# Patient Record
Sex: FEMALE | Race: BLACK OR AFRICAN AMERICAN | NOT HISPANIC OR LATINO | Employment: UNEMPLOYED | URBAN - METROPOLITAN AREA
[De-identification: names, ages, dates, MRNs, and addresses within clinical notes are randomized per-mention and may not be internally consistent; named-entity substitution may affect disease eponyms.]

---

## 2017-01-02 ENCOUNTER — APPOINTMENT (OUTPATIENT)
Dept: BEHAVIORAL HEALTH | Age: 32
End: 2017-01-02
Attending: PSYCHIATRY & NEUROLOGY

## 2017-01-03 ENCOUNTER — TELEPHONE (OUTPATIENT)
Dept: BEHAVIORAL HEALTH | Age: 32
End: 2017-01-03

## 2017-01-03 RX ORDER — LORAZEPAM 1 MG/1
1 TABLET ORAL DAILY PRN
Qty: 10 TABLET | Refills: 0 | Status: SHIPPED | OUTPATIENT
Start: 2017-01-03 | End: 2017-03-01 | Stop reason: SDUPTHER

## 2017-01-04 ENCOUNTER — APPOINTMENT (OUTPATIENT)
Dept: BEHAVIORAL HEALTH | Age: 32
End: 2017-01-04
Attending: PSYCHIATRY & NEUROLOGY

## 2017-01-06 ENCOUNTER — APPOINTMENT (OUTPATIENT)
Dept: BEHAVIORAL HEALTH | Age: 32
End: 2017-01-06
Attending: PSYCHIATRY & NEUROLOGY

## 2017-01-09 ENCOUNTER — APPOINTMENT (OUTPATIENT)
Dept: BEHAVIORAL HEALTH | Age: 32
End: 2017-01-09
Attending: PSYCHIATRY & NEUROLOGY

## 2017-01-11 ENCOUNTER — APPOINTMENT (OUTPATIENT)
Dept: BEHAVIORAL HEALTH | Age: 32
End: 2017-01-11
Attending: PSYCHIATRY & NEUROLOGY

## 2017-01-13 ENCOUNTER — APPOINTMENT (OUTPATIENT)
Dept: BEHAVIORAL HEALTH | Age: 32
End: 2017-01-13
Attending: PSYCHIATRY & NEUROLOGY

## 2017-01-16 ENCOUNTER — APPOINTMENT (OUTPATIENT)
Dept: BEHAVIORAL HEALTH | Age: 32
End: 2017-01-16
Attending: PSYCHIATRY & NEUROLOGY

## 2017-01-18 ENCOUNTER — APPOINTMENT (OUTPATIENT)
Dept: BEHAVIORAL HEALTH | Age: 32
End: 2017-01-18
Attending: PSYCHIATRY & NEUROLOGY

## 2017-01-20 ENCOUNTER — APPOINTMENT (OUTPATIENT)
Dept: BEHAVIORAL HEALTH | Age: 32
End: 2017-01-20
Attending: PSYCHIATRY & NEUROLOGY

## 2017-01-23 ENCOUNTER — APPOINTMENT (OUTPATIENT)
Dept: BEHAVIORAL HEALTH | Age: 32
End: 2017-01-23
Attending: PSYCHIATRY & NEUROLOGY

## 2017-02-10 ENCOUNTER — APPOINTMENT (OUTPATIENT)
Dept: GENERAL RADIOLOGY | Age: 32
End: 2017-02-10

## 2017-02-10 ENCOUNTER — HOSPITAL ENCOUNTER (EMERGENCY)
Age: 32
Discharge: HOME | End: 2017-02-10

## 2017-02-10 VITALS
HEART RATE: 80 BPM | OXYGEN SATURATION: 97 % | SYSTOLIC BLOOD PRESSURE: 120 MMHG | RESPIRATION RATE: 18 BRPM | TEMPERATURE: 97 F | DIASTOLIC BLOOD PRESSURE: 83 MMHG

## 2017-02-10 DIAGNOSIS — K59.00 CONSTIPATION, UNSPECIFIED CONSTIPATION TYPE: Primary | ICD-10-CM

## 2017-02-10 DIAGNOSIS — N39.0 ACUTE UTI: ICD-10-CM

## 2017-02-10 LAB
APPEARANCE UR: CLEAR
B-HCG UR QL: NEGATIVE
BACTERIA #/AREA URNS HPF: ABNORMAL /HPF
BILIRUB UR QL STRIP: NEGATIVE
COLOR UR: YELLOW
GLUCOSE UR STRIP-MCNC: NEGATIVE MG/DL
HGB UR QL STRIP: NEGATIVE
HYALINE CASTS #/AREA URNS LPF: ABNORMAL /LPF (ref 0–5)
KETONES UR STRIP-MCNC: NEGATIVE MG/DL
LEUKOCYTE ESTERASE UR QL STRIP: ABNORMAL
NITRITE UR QL STRIP: NEGATIVE
PH UR STRIP: 6 UNITS (ref 5–7)
PROT UR STRIP-MCNC: NEGATIVE MG/DL
RBC #/AREA URNS HPF: ABNORMAL /HPF (ref 0–3)
SP GR UR STRIP: 1.02 (ref 1–1.03)
SPECIMEN SOURCE: ABNORMAL
SQUAMOUS #/AREA URNS HPF: ABNORMAL /HPF (ref 0–5)
UROBILINOGEN UR STRIP-MCNC: 0.2 MG/DL (ref 0–1)
WBC #/AREA URNS HPF: ABNORMAL /HPF (ref 0–5)

## 2017-02-10 PROCEDURE — 74022 RADEX COMPL AQT ABD SERIES: CPT | Performed by: RADIOLOGY

## 2017-02-10 PROCEDURE — 99283 EMERGENCY DEPT VISIT LOW MDM: CPT | Performed by: PHYSICIAN ASSISTANT

## 2017-02-10 PROCEDURE — 74022 RADEX COMPL AQT ABD SERIES: CPT

## 2017-02-10 PROCEDURE — 81025 URINE PREGNANCY TEST: CPT | Performed by: EMERGENCY MEDICINE

## 2017-02-10 PROCEDURE — 81001 URINALYSIS AUTO W/SCOPE: CPT

## 2017-02-10 PROCEDURE — 99284 EMERGENCY DEPT VISIT MOD MDM: CPT

## 2017-02-10 PROCEDURE — 51798 US URINE CAPACITY MEASURE: CPT | Performed by: EMERGENCY MEDICINE

## 2017-02-10 PROCEDURE — 87086 URINE CULTURE/COLONY COUNT: CPT

## 2017-02-10 RX ORDER — CIPROFLOXACIN 250 MG/1
250 TABLET, FILM COATED ORAL 2 TIMES DAILY
Qty: 6 TABLET | Refills: 0 | Status: SHIPPED | OUTPATIENT
Start: 2017-02-10 | End: 2017-02-13

## 2017-02-11 LAB
APPEARANCE SPEC: NORMAL
BACTERIA UR CULT: NORMAL
REPORT STATUS (RPT): NORMAL

## 2017-02-12 ENCOUNTER — HOSPITAL ENCOUNTER (EMERGENCY)
Age: 32
Discharge: HOME | End: 2017-02-12

## 2017-02-12 VITALS
SYSTOLIC BLOOD PRESSURE: 127 MMHG | OXYGEN SATURATION: 98 % | RESPIRATION RATE: 16 BRPM | DIASTOLIC BLOOD PRESSURE: 84 MMHG | BODY MASS INDEX: 43.4 KG/M2 | WEIGHT: 293 LBS | TEMPERATURE: 97.2 F | HEIGHT: 69 IN | HEART RATE: 93 BPM

## 2017-02-12 DIAGNOSIS — S76.212A GROIN STRAIN, LEFT, INITIAL ENCOUNTER: Primary | ICD-10-CM

## 2017-02-12 PROCEDURE — 10004651 HB RX, NO CHARGE ITEM: Performed by: EMERGENCY MEDICINE

## 2017-02-12 PROCEDURE — 99283 EMERGENCY DEPT VISIT LOW MDM: CPT

## 2017-02-12 PROCEDURE — 99283 EMERGENCY DEPT VISIT LOW MDM: CPT | Performed by: EMERGENCY MEDICINE

## 2017-02-12 RX ORDER — ACETAMINOPHEN 325 MG/1
650 TABLET ORAL ONCE
Status: COMPLETED | OUTPATIENT
Start: 2017-02-12 | End: 2017-02-12

## 2017-02-12 RX ADMIN — ACETAMINOPHEN 650 MG: 325 TABLET ORAL at 17:34

## 2017-02-12 ASSESSMENT — PAIN SCALES - GENERAL: PAINLEVEL_OUTOF10: 10

## 2017-03-05 VITALS
HEART RATE: 89 BPM | HEIGHT: 69 IN | TEMPERATURE: 98.3 F | SYSTOLIC BLOOD PRESSURE: 151 MMHG | BODY MASS INDEX: 43.4 KG/M2 | DIASTOLIC BLOOD PRESSURE: 90 MMHG | RESPIRATION RATE: 20 BRPM | OXYGEN SATURATION: 99 % | WEIGHT: 293 LBS

## 2017-03-05 PROCEDURE — 99283 EMERGENCY DEPT VISIT LOW MDM: CPT | Performed by: NURSE PRACTITIONER

## 2017-03-05 ASSESSMENT — PAIN SCALES - GENERAL: PAINLEVEL_OUTOF10: 9

## 2017-03-06 ENCOUNTER — HOSPITAL ENCOUNTER (EMERGENCY)
Age: 32
Discharge: HOME | End: 2017-03-06
Attending: EMERGENCY MEDICINE

## 2017-03-06 DIAGNOSIS — R11.2 NAUSEA AND VOMITING IN ADULT: Primary | ICD-10-CM

## 2017-03-06 LAB
APPEARANCE UR: CLEAR
B-HCG UR QL: NORMAL
BILIRUB UR QL STRIP: ABNORMAL
COLOR UR: YELLOW
GLUCOSE UR STRIP-MCNC: NEGATIVE MG/DL
HGB UR QL STRIP: NEGATIVE
KETONES UR STRIP-MCNC: 15 MG/DL
LEUKOCYTE ESTERASE UR QL STRIP: NEGATIVE
NITRITE UR QL STRIP: NEGATIVE
PH UR STRIP: 6 UNITS (ref 5–7)
PROT UR STRIP-MCNC: 30 MG/DL
SP GR UR STRIP: 1.02 (ref 1–1.03)
UROBILINOGEN UR STRIP-MCNC: 1 MG/DL (ref 0–1)

## 2017-03-06 PROCEDURE — 10002803 HB RX 637: Performed by: EMERGENCY MEDICINE

## 2017-03-06 PROCEDURE — 99284 EMERGENCY DEPT VISIT MOD MDM: CPT | Performed by: EMERGENCY MEDICINE

## 2017-03-06 PROCEDURE — 81003 URINALYSIS AUTO W/O SCOPE: CPT

## 2017-03-06 PROCEDURE — 81025 URINE PREGNANCY TEST: CPT | Performed by: EMERGENCY MEDICINE

## 2017-03-06 RX ORDER — ONDANSETRON HYDROCHLORIDE 8 MG/1
8 TABLET, FILM COATED ORAL EVERY 8 HOURS PRN
Qty: 20 TABLET | Refills: 0 | Status: SHIPPED | OUTPATIENT
Start: 2017-03-06 | End: 2017-05-23 | Stop reason: ALTCHOICE

## 2017-03-06 RX ORDER — ONDANSETRON 4 MG/1
8 TABLET, ORALLY DISINTEGRATING ORAL ONCE
Status: COMPLETED | OUTPATIENT
Start: 2017-03-06 | End: 2017-03-06

## 2017-03-06 RX ADMIN — ONDANSETRON 8 MG: 4 TABLET, ORALLY DISINTEGRATING ORAL at 01:16

## 2017-03-06 ASSESSMENT — PAIN SCALES - GENERAL: PAINLEVEL_OUTOF10: 9

## 2017-03-28 ENCOUNTER — HOSPITAL ENCOUNTER (EMERGENCY)
Age: 32
Discharge: HOME | End: 2017-03-29

## 2017-03-28 DIAGNOSIS — R10.2 PELVIC PAIN IN FEMALE: ICD-10-CM

## 2017-03-28 DIAGNOSIS — Z32.02 PREGNANCY EXAMINATION OR TEST, NEGATIVE RESULT: Primary | ICD-10-CM

## 2017-03-28 PROCEDURE — 99284 EMERGENCY DEPT VISIT MOD MDM: CPT

## 2017-03-28 PROCEDURE — 81025 URINE PREGNANCY TEST: CPT | Performed by: EMERGENCY MEDICINE

## 2017-03-28 RX ORDER — ZOLPIDEM TARTRATE 10 MG/1
TABLET ORAL
Qty: 30 TABLET | Refills: 0 | Status: CANCELLED | OUTPATIENT
Start: 2017-03-28

## 2017-03-28 RX ORDER — LORAZEPAM 1 MG/1
TABLET ORAL
Qty: 30 TABLET | Refills: 0 | Status: CANCELLED | OUTPATIENT
Start: 2017-03-28

## 2017-03-28 ASSESSMENT — PAIN SCALES - GENERAL: PAINLEVEL_OUTOF10: 10

## 2017-03-29 ENCOUNTER — TELEPHONE (OUTPATIENT)
Dept: BEHAVIORAL HEALTH | Age: 32
End: 2017-03-29

## 2017-03-29 VITALS
RESPIRATION RATE: 16 BRPM | TEMPERATURE: 98.5 F | HEART RATE: 74 BPM | DIASTOLIC BLOOD PRESSURE: 72 MMHG | OXYGEN SATURATION: 99 % | SYSTOLIC BLOOD PRESSURE: 138 MMHG

## 2017-03-29 LAB
APPEARANCE UR: ABNORMAL
B-HCG UR QL: NEGATIVE
BILIRUB UR QL STRIP: NEGATIVE
COLOR UR: ABNORMAL
GLUCOSE UR STRIP-MCNC: NEGATIVE MG/DL
HGB UR QL STRIP: ABNORMAL
KETONES UR STRIP-MCNC: NEGATIVE MG/DL
LEUKOCYTE ESTERASE UR QL STRIP: ABNORMAL
NITRITE UR QL STRIP: NEGATIVE
PH UR STRIP: 5.5 UNITS (ref 5–7)
PROT UR STRIP-MCNC: 100 MG/DL
SP GR UR STRIP: 1.02 (ref 1–1.03)
UROBILINOGEN UR STRIP-MCNC: 0.2 MG/DL (ref 0–1)

## 2017-03-29 PROCEDURE — 81003 URINALYSIS AUTO W/O SCOPE: CPT

## 2017-03-29 PROCEDURE — 99283 EMERGENCY DEPT VISIT LOW MDM: CPT | Performed by: PHYSICIAN ASSISTANT

## 2017-03-29 RX ORDER — ACETAMINOPHEN 500 MG
1000 TABLET ORAL ONCE
Status: DISCONTINUED | OUTPATIENT
Start: 2017-03-29 | End: 2017-03-29

## 2017-03-29 ASSESSMENT — ENCOUNTER SYMPTOMS
SORE THROAT: 0
BLOOD IN STOOL: 0
ABDOMINAL PAIN: 0
BACK PAIN: 0
HEADACHES: 0
WOUND: 0
DIZZINESS: 0
APPETITE CHANGE: 0
CONSTIPATION: 0
FATIGUE: 0
CHILLS: 0
DIARRHEA: 0
WEAKNESS: 0
NAUSEA: 0
VOMITING: 0
SHORTNESS OF BREATH: 0
WHEEZING: 0
CONFUSION: 0
FEVER: 0
COUGH: 0
BRUISES/BLEEDS EASILY: 0

## 2017-03-30 RX ORDER — LORAZEPAM 1 MG/1
1 TABLET ORAL EVERY MORNING
Qty: 30 TABLET | Refills: 0 | Status: SHIPPED
Start: 2017-03-30 | End: 2017-04-14

## 2017-03-30 RX ORDER — ZOLPIDEM TARTRATE 10 MG/1
10 TABLET ORAL NIGHTLY
Qty: 30 TABLET | Refills: 0 | Status: SHIPPED
Start: 2017-03-30 | End: 2017-04-29

## 2017-04-04 ENCOUNTER — TELEPHONE (OUTPATIENT)
Dept: BEHAVIORAL HEALTH | Age: 32
End: 2017-04-04

## 2017-04-14 ENCOUNTER — TELEPHONE (OUTPATIENT)
Dept: BEHAVIORAL HEALTH | Age: 32
End: 2017-04-14

## 2017-04-14 RX ORDER — CLONAZEPAM 0.5 MG/1
0.5 TABLET ORAL 2 TIMES DAILY PRN
Qty: 30 TABLET | Refills: 1 | Status: ON HOLD
Start: 2017-04-14 | End: 2017-05-24 | Stop reason: HOSPADM

## 2017-05-23 ENCOUNTER — HOSPITAL ENCOUNTER (INPATIENT)
Dept: BEHAVIORAL HEALTH | Age: 32
LOS: 2 days | Discharge: HOME | DRG: 751 | End: 2017-05-25
Attending: PSYCHIATRY & NEUROLOGY | Admitting: PSYCHIATRY & NEUROLOGY

## 2017-05-23 PROCEDURE — 10004577 HB ROOM CHARGE PSYCH

## 2017-05-23 PROCEDURE — HZ2ZZZZ DETOXIFICATION SERVICES FOR SUBSTANCE ABUSE TREATMENT: ICD-10-PCS | Performed by: PSYCHIATRY & NEUROLOGY

## 2017-05-23 PROCEDURE — 81025 URINE PREGNANCY TEST: CPT | Performed by: PSYCHIATRY & NEUROLOGY

## 2017-05-23 RX ORDER — MAGNESIUM HYDROXIDE/ALUMINUM HYDROXICE/SIMETHICONE 120; 1200; 1200 MG/30ML; MG/30ML; MG/30ML
30 SUSPENSION ORAL EVERY 4 HOURS PRN
Status: DISCONTINUED | OUTPATIENT
Start: 2017-05-23 | End: 2017-05-25 | Stop reason: HOSPADM

## 2017-05-23 RX ORDER — ZOLPIDEM TARTRATE 5 MG/1
5 TABLET ORAL NIGHTLY PRN
Status: DISCONTINUED | OUTPATIENT
Start: 2017-05-23 | End: 2017-05-24

## 2017-05-23 RX ORDER — ALBUTEROL SULFATE 90 UG/1
2 AEROSOL, METERED RESPIRATORY (INHALATION) EVERY 4 HOURS PRN
Status: DISCONTINUED | OUTPATIENT
Start: 2017-05-23 | End: 2017-05-25 | Stop reason: HOSPADM

## 2017-05-23 RX ORDER — HALOPERIDOL 5 MG/ML
5 INJECTION INTRAMUSCULAR
Status: DISCONTINUED | OUTPATIENT
Start: 2017-05-23 | End: 2017-05-25 | Stop reason: HOSPADM

## 2017-05-23 RX ORDER — OLANZAPINE 5 MG/1
5 TABLET, ORALLY DISINTEGRATING ORAL
Status: DISCONTINUED | OUTPATIENT
Start: 2017-05-23 | End: 2017-05-25 | Stop reason: HOSPADM

## 2017-05-23 RX ORDER — ACETAMINOPHEN 325 MG/1
650 TABLET ORAL EVERY 4 HOURS PRN
Status: DISCONTINUED | OUTPATIENT
Start: 2017-05-23 | End: 2017-05-25 | Stop reason: HOSPADM

## 2017-05-23 RX ORDER — BENZTROPINE MESYLATE 1 MG/ML
1 INJECTION INTRAMUSCULAR; INTRAVENOUS EVERY 4 HOURS PRN
Status: DISCONTINUED | OUTPATIENT
Start: 2017-05-23 | End: 2017-05-25 | Stop reason: HOSPADM

## 2017-05-23 RX ORDER — BENZTROPINE MESYLATE 1 MG/1
1 TABLET ORAL EVERY 4 HOURS PRN
Status: DISCONTINUED | OUTPATIENT
Start: 2017-05-23 | End: 2017-05-25 | Stop reason: HOSPADM

## 2017-05-23 RX ORDER — AMOXICILLIN 250 MG
2 CAPSULE ORAL DAILY PRN
Status: DISCONTINUED | OUTPATIENT
Start: 2017-05-23 | End: 2017-05-25 | Stop reason: HOSPADM

## 2017-05-23 RX ORDER — LOPERAMIDE HYDROCHLORIDE 2 MG/1
2 CAPSULE ORAL PRN
Status: DISCONTINUED | OUTPATIENT
Start: 2017-05-23 | End: 2017-05-25 | Stop reason: HOSPADM

## 2017-05-23 RX ORDER — ONDANSETRON 4 MG/1
4 TABLET, ORALLY DISINTEGRATING ORAL 2 TIMES DAILY PRN
Status: DISCONTINUED | OUTPATIENT
Start: 2017-05-23 | End: 2017-05-25 | Stop reason: HOSPADM

## 2017-05-23 RX ORDER — LORAZEPAM 1 MG/1
1 TABLET ORAL EVERY 4 HOURS PRN
Status: DISCONTINUED | OUTPATIENT
Start: 2017-05-23 | End: 2017-05-24

## 2017-05-23 RX ORDER — NICOTINE 21 MG/24HR
1 PATCH, TRANSDERMAL 24 HOURS TRANSDERMAL DAILY
Status: DISCONTINUED | OUTPATIENT
Start: 2017-05-23 | End: 2017-05-25 | Stop reason: HOSPADM

## 2017-05-23 RX ORDER — LORAZEPAM 2 MG/ML
1 INJECTION INTRAMUSCULAR EVERY 4 HOURS PRN
Status: DISCONTINUED | OUTPATIENT
Start: 2017-05-23 | End: 2017-05-25 | Stop reason: HOSPADM

## 2017-05-23 RX ORDER — HALOPERIDOL 5 MG/1
10 TABLET ORAL
Status: DISCONTINUED | OUTPATIENT
Start: 2017-05-23 | End: 2017-05-25 | Stop reason: HOSPADM

## 2017-05-23 ASSESSMENT — ACTIVITIES OF DAILY LIVING (ADL)
ADL_SCORE: 12
ADL_BEFORE_ADMISSION: INDEPENDENT
RECENT_DECLINE_ADL: NO
CHRONIC_PAIN_PRESENT: NO
ADL_SHORT_OF_BREATH: NO

## 2017-05-23 ASSESSMENT — LIFESTYLE VARIABLES
BENZODIAZEPINES / SEDATIVES: YES
ALCOHOL_TYPE: HARD LIQUOR
HOW MANY STANDARD DRINKS CONTAINING ALCOHOL DO YOU HAVE ON A TYPICAL DAY: 7 TO 9
VOLATILE SOLVENTS / INHALANTS: DENIES
CAFFEINE: YES
HOW MANY STANDARD DRINKS CONTAINING ALCOHOL DO YOU HAVE ON A TYPICAL DAY: 7 TO 9
HOW OFTEN HAVE YOU BEEN INVOLVED IN ANY CRIMINAL OR ILLEGAL ACTIVITIES SUCH AS DRIVING A MOTOR VEHICLE UNDER THE INFLUENCE OF ALCOHOL OR DRUGS, ASSAULT, SHOPLIFTING, SUPPLYING AN ILLICIT SUBSTANCE TO ANOTHER PERSON (DO NOT INCLUDE USING ILLEGAL DRUGS).: YES
OPIATES: DENIES
E-CIGARETTE_USE: NO E-CIGARETTES USE IN THE LAST 30 DAYS
TOBACCO_USE_STATUS_IN_THE_LAST_30_DAYS: NO TOBACCO USED IN THE LAST 30 DAYS
E-CIGARETTE_USE: NO E-CIGARETTES USE IN THE LAST 30 DAYS
AUDIT-C TOTAL SCORE: 11
HOW OFTEN DO YOU HAVE A DRINK CONTAINING ALCOHOL: 4 OR MORE TIMES PER WEEK
HOW OFTEN DO YOU HAVE 6 OR MORE DRINKS ON ONE OCCASION: DAILY OR ALMOST DAILY
MARIJUANA, HASHISH: DENIES
COCAINE: DENIES
HOW OFTEN DO YOU HAVE 6 OR MORE DRINKS ON ONE OCCASION: DAILY OR ALMOST DAILY
VOLATILE SOLVENTS / INHALANTS: DENIES
MARIJUANA, HASHISH: DENIES
AUDIT-C TOTAL SCORE: 11
HALLUCINOGENS: DENIES
ALCOHOL_TYPE: HARD LIQUOR
HALLUCINOGENS: DENIES
AMPHETAMINES / STIMULANTS: DENIES
ALCOHOL_USE: YES
COCAINE: DENIES
ALCOHOL_USE_STATUS: UNHEALTHY DRINKING IDENTIFIED. AUDIT C: 3 OR MORE FOR WOMEN AND 4 OR MORE FOR MEN.
HOW OFTEN DO YOU HAVE A DRINK CONTAINING ALCOHOL: 4 OR MORE TIMES PER WEEK
ALCOHOL_USE: YES
OPIATES: DENIES
AMPHETAMINES / STIMULANTS: DENIES
BENZODIAZEPINES / SEDATIVES: YES

## 2017-05-23 ASSESSMENT — COGNITIVE AND FUNCTIONAL STATUS - GENERAL
ARE YOU BLIND OR DO YOU HAVE SERIOUS DIFFICULTY SEEING, EVEN WHEN WEARING GLASSES: NO
JUDGEMENT: POOR
THOUGHT_PROCESS: UNREMARKABLE
PERCEPTUAL_DISORDERS_HALLUCINATIONS: AUDITORY;DESCRIBE COMMANDS
MOTOR_BEHAVIOR_AGITATION: UNREMARKABLE
AFFECT_AND_BEHAVIOR: DEPRESSED;SUICIDAL/SUICIDAL IDEATION;SAD
APPEARANCE_AND_DRESS: UNREMARKABLE
ORIENTED: PERSON;PLACE;CIRCUMSTANCE;TIME
ATTENTION: ABILITY TO MAINTAIN ATTENTION
SPEECH: UNREMARKABLE
INSIGHT: POOR
MEMORY: ABILITY TO RETAIN PAST AND PRESENT EVENTS
RELIABILITY: APPEARS TO BE TRUTHFUL;APPEARS TO MINIMIZE
LEVEL_OF_CONSCIOUSNESS: ALERT;ORIENTED
ARE YOU DEAF OR DO YOU HAVE SERIOUS DIFFICULTY  HEARING: NO
THOUGHT_CONTENT: UNREMARKABLE
MOOD: DEPRESSED;IRRITABLE
MOTOR_BEHAVIOR_RETARDATION: UNREMARKABLE

## 2017-05-24 PROBLEM — F33.2 MDD (MAJOR DEPRESSIVE DISORDER), RECURRENT EPISODE, SEVERE (CMD): Status: ACTIVE | Noted: 2017-05-24

## 2017-05-24 PROBLEM — F10.930 ALCOHOL WITHDRAWAL SYNDROME WITHOUT COMPLICATION (CMD): Status: ACTIVE | Noted: 2017-05-24

## 2017-05-24 PROBLEM — Z86.39 HISTORY OF VITAMIN D DEFICIENCY: Status: ACTIVE | Noted: 2017-05-24

## 2017-05-24 PROBLEM — F43.10 PTSD (POST-TRAUMATIC STRESS DISORDER): Status: ACTIVE | Noted: 2017-05-24

## 2017-05-24 LAB
ALBUMIN SERPL-MCNC: 3.5 G/DL (ref 3.6–5.1)
ALBUMIN/GLOB SERPL: 1 {RATIO} (ref 1–2.4)
ALP SERPL-CCNC: 112 UNITS/L (ref 45–117)
ALT SERPL-CCNC: 21 UNITS/L
ANION GAP SERPL CALC-SCNC: 14 MMOL/L (ref 10–20)
AST SERPL-CCNC: 14 UNITS/L
B-HCG UR QL: NORMAL
BASOPHILS # BLD AUTO: 0 K/MCL (ref 0–0.3)
BASOPHILS NFR BLD AUTO: 0 %
BILIRUB SERPL-MCNC: 0.4 MG/DL (ref 0.2–1)
BUN SERPL-MCNC: 10 MG/DL (ref 6–20)
BUN/CREAT SERPL: 11 (ref 7–25)
CALCIUM SERPL-MCNC: 9.1 MG/DL (ref 8.4–10.2)
CHLORIDE SERPL-SCNC: 106 MMOL/L (ref 98–107)
CO2 SERPL-SCNC: 25 MMOL/L (ref 21–32)
CREAT SERPL-MCNC: 0.94 MG/DL (ref 0.51–0.95)
DIFFERENTIAL METHOD BLD: ABNORMAL
EOSINOPHIL # BLD AUTO: 0.3 K/MCL (ref 0.1–0.5)
EOSINOPHIL NFR SPEC: 2 %
ERYTHROCYTE [DISTWIDTH] IN BLOOD: 16.6 % (ref 11–15)
GGT SERPL-CCNC: 38 UNITS/L (ref 5–55)
GLOBULIN SER-MCNC: 3.6 G/DL (ref 2–4)
GLUCOSE SERPL-MCNC: 79 MG/DL (ref 65–99)
HCT VFR BLD CALC: 38.4 % (ref 36–46.5)
HGB BLD-MCNC: 12.9 G/DL (ref 12–15.5)
LYMPHOCYTES # BLD MANUAL: 4.1 K/MCL (ref 1–4.8)
LYMPHOCYTES NFR BLD MANUAL: 29 %
MCH RBC QN AUTO: 26.7 PG (ref 26–34)
MCHC RBC AUTO-ENTMCNC: 33.6 G/DL (ref 32–36.5)
MCV RBC AUTO: 79.5 FL (ref 78–100)
MONOCYTES # BLD MANUAL: 0.7 K/MCL (ref 0.3–0.9)
MONOCYTES NFR BLD MANUAL: 5 %
NEUTROPHILS # BLD AUTO: 9.3 K/MCL (ref 1.8–7.7)
NEUTROPHILS NFR BLD AUTO: 64 %
NRBC BLD MANUAL-RTO: 0 %
PLATELET # BLD: 497 K/MCL (ref 140–450)
POTASSIUM SERPL-SCNC: 4.2 MMOL/L (ref 3.4–5.1)
PROT SERPL-MCNC: 7.1 G/DL (ref 6.4–8.2)
RBC # BLD: 4.83 MIL/MCL (ref 4–5.2)
SODIUM SERPL-SCNC: 141 MMOL/L (ref 135–145)
TSH SERPL-ACNC: 1.31 MCUNITS/ML (ref 0.35–5)
WBC # BLD: 14.4 K/MCL (ref 4.2–11)

## 2017-05-24 PROCEDURE — 85025 COMPLETE CBC W/AUTO DIFF WBC: CPT

## 2017-05-24 PROCEDURE — 80053 COMPREHEN METABOLIC PANEL: CPT

## 2017-05-24 PROCEDURE — 10004577 HB ROOM CHARGE PSYCH

## 2017-05-24 PROCEDURE — 82977 ASSAY OF GGT: CPT

## 2017-05-24 PROCEDURE — 10002803 HB RX 637: Performed by: PSYCHIATRY & NEUROLOGY

## 2017-05-24 PROCEDURE — 36415 COLL VENOUS BLD VENIPUNCTURE: CPT

## 2017-05-24 PROCEDURE — 10004325 HB COUNTER ASSESSMENT EA 15 MIN

## 2017-05-24 PROCEDURE — 10004651 HB RX, NO CHARGE ITEM: Performed by: PSYCHIATRY & NEUROLOGY

## 2017-05-24 PROCEDURE — 84443 ASSAY THYROID STIM HORMONE: CPT

## 2017-05-24 PROCEDURE — 99253 IP/OBS CNSLTJ NEW/EST LOW 45: CPT | Performed by: PHYSICIAN ASSISTANT

## 2017-05-24 PROCEDURE — 82306 VITAMIN D 25 HYDROXY: CPT

## 2017-05-24 PROCEDURE — 99223 1ST HOSP IP/OBS HIGH 75: CPT | Performed by: PSYCHIATRY & NEUROLOGY

## 2017-05-24 RX ORDER — MULTIVITAMIN,THER AND MINERALS
1 TABLET ORAL DAILY
Status: DISCONTINUED | OUTPATIENT
Start: 2017-05-24 | End: 2017-05-25 | Stop reason: HOSPADM

## 2017-05-24 RX ORDER — HYDROXYZINE HYDROCHLORIDE 25 MG/1
25 TABLET, FILM COATED ORAL EVERY 6 HOURS PRN
Status: DISCONTINUED | OUTPATIENT
Start: 2017-05-24 | End: 2017-05-25 | Stop reason: HOSPADM

## 2017-05-24 RX ORDER — HYDROXYZINE 50 MG/1
100 TABLET, FILM COATED ORAL NIGHTLY PRN
Qty: 30 TABLET | Refills: 2 | Status: SHIPPED | OUTPATIENT
Start: 2017-05-24 | End: 2017-05-25

## 2017-05-24 RX ORDER — HYDROXYZINE HYDROCHLORIDE 25 MG/1
25 TABLET, FILM COATED ORAL EVERY 6 HOURS PRN
Qty: 30 TABLET | Refills: 2 | Status: SHIPPED | OUTPATIENT
Start: 2017-05-24 | End: 2017-05-25

## 2017-05-24 RX ORDER — PRAZOSIN HYDROCHLORIDE 1 MG/1
1 CAPSULE ORAL EVERY 12 HOURS SCHEDULED
Qty: 30 CAPSULE | Refills: 3 | Status: SHIPPED | OUTPATIENT
Start: 2017-05-24 | End: 2017-05-25

## 2017-05-24 RX ORDER — LORAZEPAM 2 MG/ML
2 INJECTION INTRAMUSCULAR
Status: DISCONTINUED | OUTPATIENT
Start: 2017-05-24 | End: 2017-05-25 | Stop reason: HOSPADM

## 2017-05-24 RX ORDER — FOLIC ACID 1 MG/1
1 TABLET ORAL DAILY
Status: DISCONTINUED | OUTPATIENT
Start: 2017-05-24 | End: 2017-05-25 | Stop reason: HOSPADM

## 2017-05-24 RX ORDER — ROPINIROLE 0.25 MG/1
0.25 TABLET, FILM COATED ORAL 3 TIMES DAILY
Status: DISCONTINUED | OUTPATIENT
Start: 2017-05-24 | End: 2017-05-24

## 2017-05-24 RX ORDER — ROPINIROLE 0.25 MG/1
0.25 TABLET, FILM COATED ORAL NIGHTLY
Status: DISCONTINUED | OUTPATIENT
Start: 2017-05-24 | End: 2017-05-25 | Stop reason: HOSPADM

## 2017-05-24 RX ORDER — PRAZOSIN HYDROCHLORIDE 1 MG/1
1 CAPSULE ORAL EVERY 12 HOURS SCHEDULED
Status: DISCONTINUED | OUTPATIENT
Start: 2017-05-24 | End: 2017-05-25 | Stop reason: HOSPADM

## 2017-05-24 RX ORDER — LANOLIN ALCOHOL/MO/W.PET/CERES
100 CREAM (GRAM) TOPICAL DAILY
Status: DISCONTINUED | OUTPATIENT
Start: 2017-05-24 | End: 2017-05-25 | Stop reason: HOSPADM

## 2017-05-24 RX ORDER — HYDROXYZINE HYDROCHLORIDE 25 MG/1
100 TABLET, FILM COATED ORAL NIGHTLY PRN
Status: DISCONTINUED | OUTPATIENT
Start: 2017-05-24 | End: 2017-05-25 | Stop reason: HOSPADM

## 2017-05-24 RX ORDER — LORAZEPAM 1 MG/1
2 TABLET ORAL
Status: DISCONTINUED | OUTPATIENT
Start: 2017-05-24 | End: 2017-05-25 | Stop reason: HOSPADM

## 2017-05-24 RX ADMIN — ACETAMINOPHEN 650 MG: 325 TABLET ORAL at 09:26

## 2017-05-24 RX ADMIN — HYDROXYZINE HYDROCHLORIDE 100 MG: 25 TABLET, FILM COATED ORAL at 21:29

## 2017-05-24 RX ADMIN — Medication 1 TABLET: at 17:08

## 2017-05-24 RX ADMIN — ZOLPIDEM TARTRATE 5 MG: 5 TABLET, COATED ORAL at 02:50

## 2017-05-24 RX ADMIN — ROPINIROLE 0.25 MG: 0.25 TABLET ORAL at 21:28

## 2017-05-24 RX ADMIN — PRAZOSIN HYDROCHLORIDE 1 MG: 1 CAPSULE ORAL at 21:29

## 2017-05-24 RX ADMIN — Medication 100 MG: at 17:08

## 2017-05-24 RX ADMIN — ALBUTEROL SULFATE 2 PUFF: 90 AEROSOL, METERED RESPIRATORY (INHALATION) at 02:52

## 2017-05-24 RX ADMIN — FOLIC ACID 1 MG: 1 TABLET ORAL at 17:08

## 2017-05-24 RX ADMIN — HYDROXYZINE HYDROCHLORIDE 25 MG: 25 TABLET, FILM COATED ORAL at 16:07

## 2017-05-24 RX ADMIN — LORAZEPAM 1 MG: 1 TABLET ORAL at 10:18

## 2017-05-24 RX ADMIN — SERTRALINE HYDROCHLORIDE 150 MG: 100 TABLET, FILM COATED ORAL at 16:07

## 2017-05-24 RX ADMIN — ALBUTEROL SULFATE 2 PUFF: 90 AEROSOL, METERED RESPIRATORY (INHALATION) at 11:59

## 2017-05-24 RX ADMIN — LORAZEPAM 1 MG: 1 TABLET ORAL at 02:50

## 2017-05-24 RX ADMIN — OLANZAPINE 5 MG: 5 TABLET, ORALLY DISINTEGRATING ORAL at 13:57

## 2017-05-24 ASSESSMENT — ANXIETY QUESTIONNAIRES
7. FEELING AFRAID AS IF SOMETHING AWFUL MIGHT HAPPEN: 3 - NEARLY EVERY DAY
3. WORRYING TOO MUCH ABOUT DIFFERENT THINGS: 3 - NEARLY EVERY DAY
1. FEELING NERVOUS, ANXIOUS, OR ON EDGE: 3 - NEARLY EVERY DAY
2. NOT BEING ABLE TO STOP OR CONTROL WORRYING: 3 - NEARLY EVERY DAY
4. TROUBLE RELAXING: 3 - NEARLY EVERY DAY
6. BECOMING EASILY ANNOYED OR IRRITABLE: 3 - NEARLY EVERY DAY
GAD7 TOTAL SCORE: 20
5. BEING SO RESTLESS THAT IT IS HARD TO SIT STILL: 2 - MORE THAN HALF THE DAYS

## 2017-05-24 ASSESSMENT — PAIN SCALES - GENERAL
PAIN_LEVEL_AT_REST: 2
PAIN_LEVEL_AT_REST: 7

## 2017-05-24 ASSESSMENT — PATIENT HEALTH QUESTIONNAIRE - PHQ9
4. FEELING TIRED OR HAVING LITTLE ENERGY: NEARLY EVERY DAY
8. MOVING OR SPEAKING SO SLOWLY THAT OTHER PEOPLE COULD HAVE NOTICED. OR THE OPPOSITE, BEING SO FIGETY OR RESTLESS THAT YOU HAVE BEEN MOVING AROUND A LOT MORE THAN USUAL: SEVERAL DAYS
9. THOUGHTS THAT YOU WOULD BE BETTER OFF DEAD, OR OF HURTING YOURSELF: SEVERAL DAYS
1. LITTLE INTEREST OR PLEASURE IN DOING THINGS: MORE THAN HALF THE DAYS
6. FEELING BAD ABOUT YOURSELF - OR THAT YOU ARE A FAILURE OR HAVE LET YOURSELF OR YOUR FAMILY DOWN: NEARLY EVERY DAY
3. TROUBLE FALLING OR STAYING ASLEEP OR SLEEPING TOO MUCH: NEARLY EVERY DAY
SUM OF ALL RESPONSES TO PHQ QUESTIONS 1-9: 19
5. POOR APPETITE OR OVEREATING: MORE THAN HALF THE DAYS
10. IF YOU CHECKED OFF ANY PROBLEMS, HOW DIFFICULT HAVE THESE PROBLEMS MADE IT FOR YOU TO DO YOUR WORK, TAKE CARE OF THINGS AT HOME, OR GET ALONG WITH OTHER PEOPLE: EXTREMELY DIFFICULT
7. TROUBLE CONCENTRATING ON THINGS, SUCH AS READING THE NEWSPAPER OR WATCHING TELEVISION: MORE THAN HALF THE DAYS
2. FEELING DOWN, DEPRESSED OR HOPELESS: MORE THAN HALF THE DAYS

## 2017-05-24 ASSESSMENT — LIFESTYLE VARIABLES
ALCOHOL_USE_PAST12MONTHS: YES
PATTERN OF SUBSTANCE USE PAST TWELVE MONTHS: NO

## 2017-05-25 VITALS
WEIGHT: 293 LBS | RESPIRATION RATE: 20 BRPM | SYSTOLIC BLOOD PRESSURE: 142 MMHG | OXYGEN SATURATION: 97 % | HEIGHT: 69 IN | HEART RATE: 96 BPM | BODY MASS INDEX: 43.4 KG/M2 | TEMPERATURE: 97.6 F | DIASTOLIC BLOOD PRESSURE: 94 MMHG

## 2017-05-25 LAB
25(OH)D3+25(OH)D2 SERPL-MCNC: 9.1 NG/ML (ref 30–100)
MAGNESIUM SERPL-MCNC: 2.2 MG/DL (ref 1.7–2.4)

## 2017-05-25 PROCEDURE — 10002803 HB RX 637: Performed by: PSYCHIATRY & NEUROLOGY

## 2017-05-25 PROCEDURE — 83735 ASSAY OF MAGNESIUM: CPT

## 2017-05-25 PROCEDURE — 36415 COLL VENOUS BLD VENIPUNCTURE: CPT

## 2017-05-25 RX ORDER — ROPINIROLE 0.25 MG/1
0.25 TABLET, FILM COATED ORAL NIGHTLY
Qty: 90 TABLET | Refills: 0 | Status: SHIPPED | INPATIENT
Start: 2017-05-25

## 2017-05-25 RX ORDER — PRAZOSIN HYDROCHLORIDE 1 MG/1
1 CAPSULE ORAL EVERY 12 HOURS SCHEDULED
Qty: 30 CAPSULE | Refills: 3 | Status: ON HOLD | OUTPATIENT
Start: 2017-05-25 | End: 2017-07-27 | Stop reason: HOSPADM

## 2017-05-25 RX ORDER — HYDROXYZINE HYDROCHLORIDE 25 MG/1
25 TABLET, FILM COATED ORAL EVERY 6 HOURS PRN
Qty: 30 TABLET | Refills: 2 | Status: ON HOLD | OUTPATIENT
Start: 2017-05-25 | End: 2017-07-27 | Stop reason: HOSPADM

## 2017-05-25 RX ORDER — HYDROXYZINE 50 MG/1
100 TABLET, FILM COATED ORAL NIGHTLY PRN
Qty: 30 TABLET | Refills: 2 | Status: ON HOLD | OUTPATIENT
Start: 2017-05-25 | End: 2017-07-27 | Stop reason: HOSPADM

## 2017-05-25 RX ADMIN — FOLIC ACID 1 MG: 1 TABLET ORAL at 08:44

## 2017-05-25 RX ADMIN — ALBUTEROL SULFATE 2 PUFF: 90 AEROSOL, METERED RESPIRATORY (INHALATION) at 08:43

## 2017-05-25 RX ADMIN — Medication 100 MG: at 08:43

## 2017-05-25 RX ADMIN — Medication 1 TABLET: at 08:44

## 2017-05-25 RX ADMIN — SERTRALINE HYDROCHLORIDE 150 MG: 100 TABLET, FILM COATED ORAL at 08:43

## 2017-05-25 RX ADMIN — PRAZOSIN HYDROCHLORIDE 1 MG: 1 CAPSULE ORAL at 08:44

## 2017-06-12 ENCOUNTER — TELEPHONE (OUTPATIENT)
Dept: BEHAVIORAL HEALTH | Age: 32
End: 2017-06-12

## 2017-07-25 ENCOUNTER — HOSPITAL ENCOUNTER (INPATIENT)
Dept: BEHAVIORAL HEALTH | Age: 32
LOS: 2 days | Discharge: HOME | DRG: 751 | End: 2017-07-27
Attending: PSYCHIATRY & NEUROLOGY | Admitting: PSYCHIATRY & NEUROLOGY

## 2017-07-25 ENCOUNTER — TELEPHONE (OUTPATIENT)
Dept: BEHAVIORAL HEALTH | Age: 32
End: 2017-07-25

## 2017-07-25 DIAGNOSIS — F32.A DEPRESSION, UNSPECIFIED DEPRESSION TYPE: Primary | ICD-10-CM

## 2017-07-25 DIAGNOSIS — E55.9 VITAMIN D DEFICIENCY: ICD-10-CM

## 2017-07-25 DIAGNOSIS — E66.9 OBESITY, UNSPECIFIED OBESITY SEVERITY, UNSPECIFIED OBESITY TYPE: ICD-10-CM

## 2017-07-25 DIAGNOSIS — F33.3 SEVERE EPISODE OF RECURRENT MAJOR DEPRESSIVE DISORDER, WITH PSYCHOTIC FEATURES (CMD): ICD-10-CM

## 2017-07-25 DIAGNOSIS — F10.10 AA (ALCOHOL ABUSE): ICD-10-CM

## 2017-07-25 DIAGNOSIS — F43.10 PTSD (POST-TRAUMATIC STRESS DISORDER): ICD-10-CM

## 2017-07-25 DIAGNOSIS — F12.10 CANNABIS ABUSE: ICD-10-CM

## 2017-07-25 PROCEDURE — 10004577 HB ROOM CHARGE PSYCH

## 2017-07-25 PROCEDURE — 10002803 HB RX 637: Performed by: PSYCHIATRY & NEUROLOGY

## 2017-07-25 RX ORDER — AMOXICILLIN 250 MG
2 CAPSULE ORAL DAILY PRN
Status: DISCONTINUED | OUTPATIENT
Start: 2017-07-25 | End: 2017-07-27 | Stop reason: HOSPADM

## 2017-07-25 RX ORDER — HYDROXYZINE HYDROCHLORIDE 25 MG/1
25 TABLET, FILM COATED ORAL EVERY 6 HOURS PRN
Status: DISCONTINUED | OUTPATIENT
Start: 2017-07-25 | End: 2017-07-27 | Stop reason: HOSPADM

## 2017-07-25 RX ORDER — MAGNESIUM HYDROXIDE/ALUMINUM HYDROXICE/SIMETHICONE 120; 1200; 1200 MG/30ML; MG/30ML; MG/30ML
30 SUSPENSION ORAL EVERY 4 HOURS PRN
Status: DISCONTINUED | OUTPATIENT
Start: 2017-07-25 | End: 2017-07-27 | Stop reason: HOSPADM

## 2017-07-25 RX ORDER — LORAZEPAM 1 MG/1
1 TABLET ORAL EVERY 4 HOURS PRN
Status: DISCONTINUED | OUTPATIENT
Start: 2017-07-25 | End: 2017-07-27 | Stop reason: HOSPADM

## 2017-07-25 RX ORDER — LOPERAMIDE HYDROCHLORIDE 2 MG/1
2 CAPSULE ORAL PRN
Status: DISCONTINUED | OUTPATIENT
Start: 2017-07-25 | End: 2017-07-27 | Stop reason: HOSPADM

## 2017-07-25 RX ORDER — HALOPERIDOL 5 MG/1
10 TABLET ORAL
Status: DISCONTINUED | OUTPATIENT
Start: 2017-07-25 | End: 2017-07-27 | Stop reason: HOSPADM

## 2017-07-25 RX ORDER — PRAZOSIN HYDROCHLORIDE 1 MG/1
1 CAPSULE ORAL EVERY 12 HOURS SCHEDULED
Status: DISCONTINUED | OUTPATIENT
Start: 2017-07-25 | End: 2017-07-27 | Stop reason: HOSPADM

## 2017-07-25 RX ORDER — ZOLPIDEM TARTRATE 5 MG/1
5 TABLET ORAL NIGHTLY PRN
Status: DISCONTINUED | OUTPATIENT
Start: 2017-07-25 | End: 2017-07-27 | Stop reason: HOSPADM

## 2017-07-25 RX ORDER — ACETAMINOPHEN 325 MG/1
650 TABLET ORAL EVERY 4 HOURS PRN
Status: DISCONTINUED | OUTPATIENT
Start: 2017-07-25 | End: 2017-07-27 | Stop reason: HOSPADM

## 2017-07-25 RX ORDER — DIPHENHYDRAMINE HCL 25 MG
25 CAPSULE ORAL DAILY PRN
Status: DISCONTINUED | OUTPATIENT
Start: 2017-07-25 | End: 2017-07-27 | Stop reason: HOSPADM

## 2017-07-25 RX ORDER — OLANZAPINE 5 MG/1
5 TABLET, ORALLY DISINTEGRATING ORAL
Status: DISCONTINUED | OUTPATIENT
Start: 2017-07-25 | End: 2017-07-27 | Stop reason: HOSPADM

## 2017-07-25 RX ORDER — HALOPERIDOL 5 MG/ML
5 INJECTION INTRAMUSCULAR
Status: DISCONTINUED | OUTPATIENT
Start: 2017-07-25 | End: 2017-07-27 | Stop reason: HOSPADM

## 2017-07-25 RX ORDER — BENZTROPINE MESYLATE 1 MG/ML
1 INJECTION INTRAMUSCULAR; INTRAVENOUS EVERY 4 HOURS PRN
Status: DISCONTINUED | OUTPATIENT
Start: 2017-07-25 | End: 2017-07-27 | Stop reason: HOSPADM

## 2017-07-25 RX ORDER — NICOTINE 21 MG/24HR
1 PATCH, TRANSDERMAL 24 HOURS TRANSDERMAL DAILY
Status: DISCONTINUED | OUTPATIENT
Start: 2017-07-25 | End: 2017-07-27 | Stop reason: HOSPADM

## 2017-07-25 RX ORDER — DIPHENHYDRAMINE HCL 25 MG
25 CAPSULE ORAL DAILY PRN
COMMUNITY

## 2017-07-25 RX ORDER — ALBUTEROL SULFATE 90 UG/1
2 AEROSOL, METERED RESPIRATORY (INHALATION) EVERY 4 HOURS PRN
Status: DISCONTINUED | OUTPATIENT
Start: 2017-07-25 | End: 2017-07-27 | Stop reason: HOSPADM

## 2017-07-25 RX ORDER — ONDANSETRON 4 MG/1
4 TABLET, ORALLY DISINTEGRATING ORAL 2 TIMES DAILY PRN
Status: DISCONTINUED | OUTPATIENT
Start: 2017-07-25 | End: 2017-07-27 | Stop reason: HOSPADM

## 2017-07-25 RX ORDER — LORAZEPAM 2 MG/ML
1 INJECTION INTRAMUSCULAR EVERY 4 HOURS PRN
Status: DISCONTINUED | OUTPATIENT
Start: 2017-07-25 | End: 2017-07-27 | Stop reason: HOSPADM

## 2017-07-25 RX ORDER — BENZTROPINE MESYLATE 1 MG/1
1 TABLET ORAL EVERY 4 HOURS PRN
Status: DISCONTINUED | OUTPATIENT
Start: 2017-07-25 | End: 2017-07-27 | Stop reason: HOSPADM

## 2017-07-25 RX ADMIN — OLANZAPINE 5 MG: 5 TABLET, ORALLY DISINTEGRATING ORAL at 21:21

## 2017-07-25 RX ADMIN — ZOLPIDEM TARTRATE 5 MG: 5 TABLET, COATED ORAL at 23:26

## 2017-07-25 RX ADMIN — PRAZOSIN HYDROCHLORIDE 1 MG: 1 CAPSULE ORAL at 21:21

## 2017-07-25 ASSESSMENT — COGNITIVE AND FUNCTIONAL STATUS - GENERAL
LEVEL_OF_CONSCIOUSNESS: ALERT;ORIENTED
ORIENTED: PERSON;PLACE;CIRCUMSTANCE;TIME
ARE YOU BLIND OR DO YOU HAVE SERIOUS DIFFICULTY SEEING, EVEN WHEN WEARING GLASSES: NO
PERCEPTUAL_DISORDERS_HALLUCINATIONS: AUDITORY
ARE YOU DEAF OR DO YOU HAVE SERIOUS DIFFICULTY  HEARING: NO
ATTENTION: ABILITY TO MAINTAIN ATTENTION
MEMORY: ABILITY TO RETAIN PAST AND PRESENT EVENTS
SPEECH: UNREMARKABLE

## 2017-07-25 ASSESSMENT — ACTIVITIES OF DAILY LIVING (ADL)
RECENT_DECLINE_ADL: NO
ADL_BEFORE_ADMISSION: INDEPENDENT
ADL_SCORE: 12
CHRONIC_PAIN_PRESENT: NO

## 2017-07-25 ASSESSMENT — LIFESTYLE VARIABLES
TYPE_OF_TOBACCO_USED: CIGARETTES
TOBACCO_USE_STATUS_IN_THE_LAST_30_DAYS: USED TOBACCO IN THE LAST 30 DAYS
AMOUNT_OF_TOBACCO_USED: FIVE OR MORE CIGARETTES PER DAY AND/OR CIGAR OR PIPE DAILY
ALCOHOL_USE_STATUS: NO OR LOW RISK WITH VALIDATED TOOL

## 2017-07-26 PROCEDURE — 10002803 HB RX 637: Performed by: PSYCHIATRY & NEUROLOGY

## 2017-07-26 PROCEDURE — 10004325 HB COUNTER ASSESSMENT EA 15 MIN: Performed by: DIETITIAN, REGISTERED

## 2017-07-26 PROCEDURE — 99222 1ST HOSP IP/OBS MODERATE 55: CPT | Performed by: PSYCHIATRY & NEUROLOGY

## 2017-07-26 PROCEDURE — 99253 IP/OBS CNSLTJ NEW/EST LOW 45: CPT | Performed by: INTERNAL MEDICINE

## 2017-07-26 PROCEDURE — 10004577 HB ROOM CHARGE PSYCH

## 2017-07-26 RX ADMIN — ARIPIPRAZOLE 10 MG: 20 TABLET ORAL at 15:17

## 2017-07-26 RX ADMIN — LORAZEPAM 1 MG: 1 TABLET ORAL at 20:05

## 2017-07-26 RX ADMIN — ZOLPIDEM TARTRATE 5 MG: 5 TABLET, COATED ORAL at 22:03

## 2017-07-26 RX ADMIN — PRAZOSIN HYDROCHLORIDE 1 MG: 1 CAPSULE ORAL at 20:05

## 2017-07-26 RX ADMIN — NICOTINE 1 PATCH: 14 PATCH TRANSDERMAL at 13:06

## 2017-07-26 RX ADMIN — ALBUTEROL SULFATE 2 PUFF: 90 AEROSOL, METERED RESPIRATORY (INHALATION) at 20:06

## 2017-07-26 RX ADMIN — SERTRALINE HYDROCHLORIDE 150 MG: 100 TABLET, FILM COATED ORAL at 13:05

## 2017-07-26 RX ADMIN — PRAZOSIN HYDROCHLORIDE 1 MG: 1 CAPSULE ORAL at 13:06

## 2017-07-26 RX ADMIN — ZOLPIDEM TARTRATE 5 MG: 5 TABLET, COATED ORAL at 23:16

## 2017-07-26 ASSESSMENT — PATIENT HEALTH QUESTIONNAIRE - PHQ9
4. FEELING TIRED OR HAVING LITTLE ENERGY: NEARLY EVERY DAY
9. THOUGHTS THAT YOU WOULD BE BETTER OFF DEAD, OR OF HURTING YOURSELF: SEVERAL DAYS
8. MOVING OR SPEAKING SO SLOWLY THAT OTHER PEOPLE COULD HAVE NOTICED. OR THE OPPOSITE, BEING SO FIGETY OR RESTLESS THAT YOU HAVE BEEN MOVING AROUND A LOT MORE THAN USUAL: NEARLY EVERY DAY
3. TROUBLE FALLING OR STAYING ASLEEP OR SLEEPING TOO MUCH: NEARLY EVERY DAY
10. IF YOU CHECKED OFF ANY PROBLEMS, HOW DIFFICULT HAVE THESE PROBLEMS MADE IT FOR YOU TO DO YOUR WORK, TAKE CARE OF THINGS AT HOME, OR GET ALONG WITH OTHER PEOPLE: EXTREMELY DIFFICULT
6. FEELING BAD ABOUT YOURSELF - OR THAT YOU ARE A FAILURE OR HAVE LET YOURSELF OR YOUR FAMILY DOWN: NEARLY EVERY DAY
2. FEELING DOWN, DEPRESSED OR HOPELESS: MORE THAN HALF THE DAYS
SUM OF ALL RESPONSES TO PHQ QUESTIONS 1-9: 22
1. LITTLE INTEREST OR PLEASURE IN DOING THINGS: SEVERAL DAYS
7. TROUBLE CONCENTRATING ON THINGS, SUCH AS READING THE NEWSPAPER OR WATCHING TELEVISION: NEARLY EVERY DAY
5. POOR APPETITE OR OVEREATING: NEARLY EVERY DAY

## 2017-07-26 ASSESSMENT — ANXIETY QUESTIONNAIRES
5. BEING SO RESTLESS THAT IT IS HARD TO SIT STILL: 3 - NEARLY EVERY DAY
1. FEELING NERVOUS, ANXIOUS, OR ON EDGE: 3 - NEARLY EVERY DAY
7. FEELING AFRAID AS IF SOMETHING AWFUL MIGHT HAPPEN: 2 - MORE THAN HALF THE DAYS
4. TROUBLE RELAXING: 3 - NEARLY EVERY DAY
3. WORRYING TOO MUCH ABOUT DIFFERENT THINGS: 3 - NEARLY EVERY DAY
2. NOT BEING ABLE TO STOP OR CONTROL WORRYING: 3 - NEARLY EVERY DAY
6. BECOMING EASILY ANNOYED OR IRRITABLE: 3 - NEARLY EVERY DAY
GAD7 TOTAL SCORE: 20

## 2017-07-27 VITALS
BODY MASS INDEX: 44.41 KG/M2 | WEIGHT: 293 LBS | HEART RATE: 101 BPM | TEMPERATURE: 98 F | DIASTOLIC BLOOD PRESSURE: 82 MMHG | SYSTOLIC BLOOD PRESSURE: 134 MMHG | HEIGHT: 68 IN | RESPIRATION RATE: 18 BRPM | OXYGEN SATURATION: 99 %

## 2017-07-27 PROCEDURE — 99239 HOSP IP/OBS DSCHRG MGMT >30: CPT | Performed by: PSYCHIATRY & NEUROLOGY

## 2017-07-27 PROCEDURE — 10002803 HB RX 637: Performed by: PSYCHIATRY & NEUROLOGY

## 2017-07-27 RX ORDER — HYDROXYZINE HYDROCHLORIDE 25 MG/1
25 TABLET, FILM COATED ORAL EVERY 6 HOURS PRN
Qty: 120 TABLET | Refills: 1 | Status: SHIPPED | OUTPATIENT
Start: 2017-07-27

## 2017-07-27 RX ORDER — PRAZOSIN HYDROCHLORIDE 1 MG/1
1 CAPSULE ORAL EVERY 12 HOURS SCHEDULED
Qty: 30 CAPSULE | Refills: 1 | Status: SHIPPED | OUTPATIENT
Start: 2017-07-27

## 2017-07-27 RX ORDER — ARIPIPRAZOLE 10 MG/1
10 TABLET ORAL DAILY
Qty: 30 TABLET | Refills: 1 | Status: SHIPPED | OUTPATIENT
Start: 2017-07-27

## 2017-07-27 RX ORDER — ZOLPIDEM TARTRATE 5 MG/1
5 TABLET ORAL NIGHTLY PRN
Qty: 30 TABLET | Refills: 1 | Status: SHIPPED | OUTPATIENT
Start: 2017-07-27

## 2017-07-27 RX ADMIN — SERTRALINE HYDROCHLORIDE 150 MG: 100 TABLET, FILM COATED ORAL at 07:51

## 2017-07-27 RX ADMIN — NICOTINE 1 PATCH: 14 PATCH TRANSDERMAL at 07:51

## 2017-07-27 RX ADMIN — ARIPIPRAZOLE 10 MG: 20 TABLET ORAL at 07:50

## 2017-07-27 RX ADMIN — LORAZEPAM 1 MG: 1 TABLET ORAL at 09:18

## 2017-07-27 RX ADMIN — PRAZOSIN HYDROCHLORIDE 1 MG: 1 CAPSULE ORAL at 07:50

## 2017-07-28 ASSESSMENT — LIFESTYLE VARIABLES
ALCOHOL_USE_PAST12MONTHS: YES
PATTERN OF SUBSTANCE USE PAST TWELVE MONTHS: YES

## 2017-07-31 ENCOUNTER — APPOINTMENT (OUTPATIENT)
Dept: BEHAVIORAL HEALTH | Age: 32
End: 2017-07-31
Attending: PSYCHIATRY & NEUROLOGY

## 2017-08-01 ENCOUNTER — APPOINTMENT (OUTPATIENT)
Dept: BEHAVIORAL HEALTH | Age: 32
End: 2017-08-01

## 2017-08-02 ENCOUNTER — APPOINTMENT (OUTPATIENT)
Dept: BEHAVIORAL HEALTH | Age: 32
End: 2017-08-02

## 2017-08-03 ENCOUNTER — APPOINTMENT (OUTPATIENT)
Dept: BEHAVIORAL HEALTH | Age: 32
End: 2017-08-03

## 2017-08-04 ENCOUNTER — APPOINTMENT (OUTPATIENT)
Dept: BEHAVIORAL HEALTH | Age: 32
End: 2017-08-04

## 2017-11-12 ENCOUNTER — OFFICE VISIT (OUTPATIENT)
Dept: URGENT CARE | Facility: URGENT CARE | Age: 32
End: 2017-11-12
Payer: COMMERCIAL

## 2017-11-12 VITALS
BODY MASS INDEX: 44.41 KG/M2 | WEIGHT: 293 LBS | DIASTOLIC BLOOD PRESSURE: 78 MMHG | SYSTOLIC BLOOD PRESSURE: 110 MMHG | HEART RATE: 62 BPM | HEIGHT: 68 IN | TEMPERATURE: 98.6 F | OXYGEN SATURATION: 100 % | RESPIRATION RATE: 16 BRPM

## 2017-11-12 DIAGNOSIS — J45.31 MILD PERSISTENT ASTHMA WITH EXACERBATION: Primary | ICD-10-CM

## 2017-11-12 PROCEDURE — 99213 OFFICE O/P EST LOW 20 MIN: CPT | Performed by: FAMILY MEDICINE

## 2017-11-12 RX ORDER — FLUTICASONE PROPIONATE 220 UG/1
2 AEROSOL, METERED RESPIRATORY (INHALATION) 2 TIMES DAILY
Qty: 1 INHALER | Refills: 3 | Status: SHIPPED | OUTPATIENT
Start: 2017-11-12 | End: 2017-11-22

## 2017-11-12 RX ORDER — ALBUTEROL SULFATE 90 UG/1
2 AEROSOL, METERED RESPIRATORY (INHALATION) EVERY 6 HOURS
Qty: 1 INHALER | Refills: 3 | Status: SHIPPED | OUTPATIENT
Start: 2017-11-12

## 2017-11-12 RX ORDER — ALBUTEROL SULFATE 90 UG/1
2 AEROSOL, METERED RESPIRATORY (INHALATION) EVERY 6 HOURS
COMMUNITY
End: 2017-11-12

## 2017-11-12 NOTE — PROGRESS NOTES
Subjective: Patient has a long history of asthma, occasionally has had to take oral steroids, used to have steroid inhaler, symptoms always get worse around this time of year but not through the whole winter. A month ago she got a bad cold, was seen in the emergency room and they gave her oral steroids but not antibiotics and since then she has been using albuterol about twice a day but she ran out a week ago and having more trouble. Mucus is just thick clear or white mucus, doesn't feel terribly sick. She needs to find a regular doctor here. She moved here from [place].    Objective: NAD. ENT is normal except for a very large tonsils. Neck is normal. Lungs are clear. Heart is regular without murmurs    Assessment and plan: Mild persistent asthma with exacerbation. Refilled albuterol and started on Flovent, follow-up with regular doctor.

## 2017-11-12 NOTE — NURSING NOTE
"Chief Complaint   Patient presents with     Urgent Care     Asthma     Has had a cold for about 1 month. Asthma flare up. Out of inhalers.        Initial /78  Pulse 62  Temp 98.6  F (37  C) (Oral)  Resp 16  Ht 5' 8\" (1.727 m)  Wt (!) 317 lb (143.8 kg)  LMP 10/12/2017  SpO2 100%  Breastfeeding? No  BMI 48.2 kg/m2 Estimated body mass index is 48.2 kg/(m^2) as calculated from the following:    Height as of this encounter: 5' 8\" (1.727 m).    Weight as of this encounter: 317 lb (143.8 kg).  Medication Reconciliation: complete  "

## 2017-11-12 NOTE — MR AVS SNAPSHOT
"              After Visit Summary   11/12/2017    Cari Barajas    MRN: [id number]           Patient Information     Date Of Birth          1985        Visit Information        Provider Department      11/12/2017 11:35 AM Gavino Tyler MD [facility]        Today's Diagnoses     Mild persistent asthma with exacerbation    -  1       Follow-ups after your visit        Your next 10 appointments already scheduled     Nov 14, 2017 11:00 AM CST   Office Visit with ALFREDO Joshi CNP   [facility] ([facility])    [address] [place] [id number] [phone]           Bring a current list of meds and any records pertaining to this visit. For Physicals, please bring immunization records and any forms needing to be filled out. Please arrive 10 minutes early to complete paperwork.              Who to contact     If you have questions or need follow up information about today's clinic visit or your schedule please contact [facility] directly at [phone].  Normal or non-critical lab and imaging results will be communicated to you by [employer], letter or phone within 4 business days after the clinic has received the results. If you do not hear from us within 7 days, please contact the clinic through hoccert or phone. If you have a critical or abnormal lab result, we will notify you by phone as soon as possible.  Submit refill requests through [employer] or call your pharmacy and they will forward the refill request to us. Please allow 3 business days for your refill to be completed.          Additional Information About Your Visit        [employer] Information     [employer] lets you send messages to your doctor, view your test results, renew your prescriptions, schedule appointments and more. To sign up, go to [email or web address] . Click on \"Log in\" on the left side of the screen, which will take you to the " "Welcome page. Then click on \"Sign up Now\" on the right side of the page.     You will be asked to enter the access code listed below, as well as some personal information. Please follow the directions to create your username and password.     Your access code is: [email or web address]  Expires: 2/10/2018 12:37 PM     Your access code will  in 90 days. If you need help or a new code, please call your [place] clinic or [phone].        Care EveryWhere ID     This is your Care EveryWhere ID. This could be used by other organizations to access your [place] medical records  [id number]        Your Vitals Were     Pulse Temperature Respirations Height Last Period Pulse Oximetry    62 98.6  F (37  C) (Oral) 16 5' 8\" (1.727 m) 10/12/2017 100%    Breastfeeding? BMI (Body Mass Index)                No 48.2 kg/m2           Blood Pressure from Last 3 Encounters:   17 110/78    Weight from Last 3 Encounters:   17 (!) 317 lb (143.8 kg)              Today, you had the following     No orders found for display         Today's Medication Changes          These changes are accurate as of: 17 12:37 PM.  If you have any questions, ask your nurse or doctor.               Start taking these medicines.        Dose/Directions    fluticasone 220 MCG/ACT Inhaler   Commonly known as:  FLOVENT HFA   Used for:  Mild persistent asthma with exacerbation   Started by:  Gavino Tyler MD        Dose:  2 puff   Inhale 2 puffs into the lungs 2 times daily   Quantity:  1 Inhaler   Refills:  3            Where to get your medicines      These medications were sent to [facility] - [place], [place] -  [address] [facility], [place] [address]     Phone:  [phone]     albuterol 108 (90 BASE) MCG/ACT Inhaler    fluticasone 220 MCG/ACT Inhaler                Primary Care Provider    Physician No Ref-Primary       NO REF-PRIMARY PHYSICIAN        Equal Access to Services     [facility] " GAAR : Hadii valentina guerrero tosha Arndt, waaxda luqadaha, qaybta kalouisda alok, waxmoe bhavik hayswapna carpenterkirbyshelby gonsalez. So [facility] [phone].    ATENCIÓN: Si habla español, tiene a dobbs disposición servicios gratuitos de asistencia lingüística. Llame al [phone].    We comply with applicable federal civil rights laws and [place] laws. We do not discriminate on the basis of race, color, national origin, age, disability, sex, sexual orientation, or gender identity.            Thank you!     Thank you for choosing [facility]  for your care. Our goal is always to provide you with excellent care. Hearing back from our patients is one way we can continue to improve our services. Please take a few minutes to complete the written survey that you may receive in the mail after your visit with us. Thank you!             Your Updated Medication List - Protect others around you: Learn how to safely use, store and throw away your medicines at www.disposemymeds.org.          This list is accurate as of: 11/12/17 12:37 PM.  Always use your most recent med list.                   Brand Name Dispense Instructions for use Diagnosis    albuterol 108 (90 BASE) MCG/ACT Inhaler    PROAIR HFA/PROVENTIL HFA/VENTOLIN HFA    1 Inhaler    Inhale 2 puffs into the lungs every 6 hours    Mild persistent asthma with exacerbation       fluticasone 220 MCG/ACT Inhaler    FLOVENT HFA    1 Inhaler    Inhale 2 puffs into the lungs 2 times daily    Mild persistent asthma with exacerbation

## 2017-11-14 NOTE — TELEPHONE ENCOUNTER
APPT INFO    Date /Time: 11/22/17, 7am    Reason for Appt: Bariatrics    Ref Provider/Clinic: Self    Are there internal records? If yes, list:    Patient Contact (Y/N) & Call Details: Yes, transferred from    Action: Emailed CHRISTINE- [email or web address]     OUTSIDE RECORDS CHECKLIST     CLINIC NAME COMMENTS REC (x) IMG (x)   [facility]

## 2017-11-18 ENCOUNTER — CARE COORDINATION (OUTPATIENT)
Dept: SURGERY | Facility: CLINIC | Age: 32
End: 2017-11-18

## 2017-11-18 NOTE — PROGRESS NOTES
Left message in regards to needing to go over new patient questions and complete questionnaire and left number to call back.

## 2017-11-22 ENCOUNTER — PRE VISIT (OUTPATIENT)
Dept: SURGERY | Facility: CLINIC | Age: 32
End: 2017-11-22

## 2017-11-22 ENCOUNTER — OFFICE VISIT (OUTPATIENT)
Dept: SURGERY | Facility: CLINIC | Age: 32
End: 2017-11-22

## 2017-11-22 ENCOUNTER — ALLIED HEALTH/NURSE VISIT (OUTPATIENT)
Dept: SURGERY | Facility: CLINIC | Age: 32
End: 2017-11-22

## 2017-11-22 VITALS
TEMPERATURE: 98.3 F | SYSTOLIC BLOOD PRESSURE: 140 MMHG | OXYGEN SATURATION: 100 % | BODY MASS INDEX: 44.41 KG/M2 | HEIGHT: 68 IN | HEART RATE: 51 BPM | WEIGHT: 293 LBS | DIASTOLIC BLOOD PRESSURE: 78 MMHG

## 2017-11-22 DIAGNOSIS — E66.01 MORBID OBESITY (H): ICD-10-CM

## 2017-11-22 DIAGNOSIS — G47.30 SLEEP APNEA, UNSPECIFIED TYPE: Primary | ICD-10-CM

## 2017-11-22 DIAGNOSIS — Z72.0 TOBACCO ABUSE: ICD-10-CM

## 2017-11-22 RX ORDER — ALBUTEROL SULFATE 90 UG/1
AEROSOL, METERED RESPIRATORY (INHALATION)
COMMUNITY

## 2017-11-22 NOTE — PROGRESS NOTES
"New Bariatric Surgery Consultation Note    RE: Crai Barajas  MR#: [id number]  : 1985      Referring provider: No flowsheet data found.    Chief Complaint/Reason for visit: evaluation for possible weight loss surgery    Dear No Ref-Primary, Physician (General),    I had the pleasure of seeing your patient, Cari Barajas, to evaluate her obesity and consider her for possible weight loss surgery. As you know, Cari Barajas is 32 year old.  She has a height of 5' 7.5\", a weight of 322 lbs 8 oz, and calculated Body mass index is 49.77 kg/(m^2).    Went through bariatric process in  in [place] but she wasn't covered by insurance.  She looked into again in  and was told she had to start over with the 6 months and she didn't want to.  She moved to [place] in September.     HISTORY OF PRESENT ILLNESS:  Weight Loss History Reviewed with Patient 2017   How long have you been overweight? Since puberty   What is the most that you have ever weighed? 360   What is the most weight you have lost? 10   I have tried the following methods to lose weight Watching portions or calories, Exercise, Weight Watchers, Pre packaged meals ex: Nutrisystem, Slimfast, OTC Medications   I have tried the following weight loss medications? (Check all that apply) Fen-Phen   Have you ever had weight loss surgery? No     CO-MORBIDITIES OF OBESITY INCLUDE:     2017   I have the following co-morbidities associated with obesity: Sleep Apnea, Asthma   Do you use a CPAP? Yes   She doesn't have a CPAP machine currently.     PAST MEDICAL HISTORY:  Past Medical History:   Diagnosis Date     Uncomplicated asthma        PAST SURGICAL HISTORY:  History reviewed. No pertinent surgical history.    FAMILY HISTORY:   Family History   Problem Relation Age of Onset     DIABETES Father      Breast Cancer Paternal Grandmother      Asthma Brother      Asthma Brother      Asthma Brother        SOCIAL HISTORY:   Social History Questions Reviewed With " Patient 11/18/2017   Which best describes your employment status (select all that apply) I work full-time, I work alternate shifts   If you work, what is your occupation?    Which best describes your marital status:    Do you have children? No   Who do you have in your support network that can be available to help you for the first 2 weeks after surgery? My cousin   Who can you count on for support throughout your weight loss surgery journey? Myself, my parents    Can you afford 3 meals a day?  Yes   Can you afford 50-60 dollars a month for vitamins? Yes   Works at a residential house caring for vulnerable adults.      HABITS:     11/18/2017   How often do you drink alcohol? Monthly or less   If you do drink alcohol, how many drinks might you have in a day? (one drink = 5 oz. wine, 1 can/bottle of beer, 1 shot liquor) 3 or 4   Do you currently use any of the following Nicotine products? cigarettes   Have you ever used any of the following nicotine products? Cigarettes   Have you or are you currently using street drugs or prescription strength medication for which you do not have a prescription for? No   Do you have a history of chemical dependency (alcohol or drug abuse)? No   Currently smokes 4-5 cigs per day    PSYCHOLOGICAL HISTORY:   Psychological History Reviewed With Patient 11/18/2017   Have you ever attempted suicide? Never.   Have you had thoughts of suicide in the past year? No   Have you ever been hospitalized for mental illness or a suicide attempt? In the last 1 to 5 years.   Do you have a history of chronic pain? No   Have you ever been diagnosed with fibromyalgia? No   Are you currently seeing a therapist or counselor?  No   Are you currently seeing a psychiatrist? No   Hospitalized January 2017 for PTSD. No suicide attempt.  She was given medications for depression and she took them for a short time.  She reports this was situational after her brother passed away.   "    ROS:     11/18/2017   Skin:  None of the above   HEENT: Headaches   Musculoskeletal: Joint Pain, Back pain   Cardiovascular: Shortness of breath with activity   Pulmonary: Shortness of breath with activity, Snoring, Awaken from sleep to catch your breath, People have told me I stop breathing while asleep, Experience morning headaches   Gastrointestinal: None of the above   Genitourinary: Stress incontinence (losing urine when coughing, sneezing, etc.)   Hematological: None of the above   Neurological: Migraine headaches   Female only: Excessive menstrual bleeding, Irregular menstrual cycles       EATING BEHAVIORS:     11/18/2017   Have you or anyone else thought that you had an eating disorder? No   Do you currently binge eat (eat a large amount of food in a short time)? No   Are you an emotional eater? Yes   Do you get up to eat after falling asleep? No       EXERCISE:     11/18/2017   How often do you exercise? Less than 1 time per week   What is the duration of your exercise (in minutes)? 10 Minutes   What types of exercise do you do? walking   What keeps you from being more active?  Pain, I should be more active but I just have not gotten around to it, Shortness of breath, Too tired       MEDICATIONS:  Current Outpatient Prescriptions   Medication     albuterol (VENTOLIN HFA) 108 (90 BASE) MCG/ACT Inhaler     albuterol (PROAIR HFA/PROVENTIL HFA/VENTOLIN HFA) 108 (90 BASE) MCG/ACT Inhaler     No current facility-administered medications for this visit.        ALLERGIES:  No Known Allergies    LABS/IMAGING/MEDICAL RECORDS REVIEW:    PHYSICAL EXAM:  /78  Pulse 51  Temp 98.3  F (36.8  C) (Oral)  Ht 5' 7.5\"  Wt (!) 322 lb 8 oz  LMP 10/12/2017  SpO2 100%  BMI 49.77 kg/m2  General: NAD  Neurologic: A & O x 3, gait normal  Head: normocephalic, atraumatic  HEENT: PERRL, EOMI.   Respiratory: respirations unlabored  Abdomen: Obese, Soft NT ND   Extremities: No LE swelling   Skin: warm and dry.  No rashes " on exposed skin  Psychiatric: Mentation and Affect appear normal    In summary, Cari Barajas has Class III obesity with a body mass index of Body mass index is 49.77 kg/(m^2). kg/m2 and the comorbidities stated above. She completed an informational seminar and is a candidate for the laparoscopic gastric sleeve.  She will have to complete the following pre-requisites:  Make appt with Tanya Alarcon for bariatric eval or call [phone] to schedule with   [email or web address]  Set up PCP  Quit smoking referral entered  Sleep referral entered  Make appt with Medical weight management MD for new consult to help with losing 20 lbs prior to sleeve  Bariatric Task List  Status:  Is patient a candidate for bariatric surgery?:  Yes -     Cleared to schedule surgeon consult?:    -     Status:  surgery evaluation in process -     Surgeon: Dr Rosas -     Tentative surgery month/year: May 2018 -        Insurance: Insurance:  [employer] -        Patient Info: Initial Weight:  322 lb 8 oz -     Date of Initial Weight/Height:  11/22/2017 -     Goal Weight (lbs):  302 -     Required Weight Loss:  20 -     Surgery Type:  sleeve gastrectomy -     Multidisciplinary Meeting:    -        Dietician Visits: Structured weight loss required by insurance?:  Yes -     Dietician Visit 1:  Completed -     Dietician Visit 2:  Needed -     Dietician Visit 3:  Needed -     Dietician Visit 4:  Needed -     Dietician Visit 5:  Needed -     Dietician Visit 6:  Needed -        Psychological Evaluation: Psych eval:  Needed -        Lab Work: Complete Blood Count:  Needed -     Comprehensive Metabolic Panel:  Needed -     Vitamin D:  Needed -     Hgb A1c:  Needed -     PTH:  Needed -     Nicotine Testing:  Needed -        Consults/ Clearance: Sleep Medicine:  Needed - known ROCCO, not using CPAP, [facility] referral entered   Medical Weight Management: Needed -        PCP: Establish care with PCP:  Needed -     PCP letter of support:  Needed -        Smoking: Quit tobacco  "use (3 months smoke free)?:  Needed -     Quit date:    -  TBD      Patient Education:  Information Session:  Needed -     Given \"Making your decision\" handout?:  Yes -     Given support group information?:  Yes -     Support plan in place?:  Completed -     Research consents signed?:  Yes -        Final Tasks:  Before surgery online class:  Needed -     Before surgery online class website link:  [email or web address]   After surgery online class:  Needed -     After surgery online class website link:  [email or web address]   Nurse visit for weigh-in and information:  Needed -     Pre-assessment clinic visit with anesthesia team for H&P:  Needed -     Final labs (Hgb, plt, T&S, UA):  Needed -        Notes:   -       Today in the office we discussed gastric sleeve surgery. Preoperative, perioperative, and postoperative processes, management, and follow up were addressed.  Risks and benefits were outlined including the risk of death, staple line leak (1-2%), PE, DVT, ulcer, worsening GERD, N/V, stricture, hernia, wound infection, weight regain, and vitamin deficiencies. I emphasized exercise and activity along with appropriate food choice as the main foundation for weight loss with surgery providing surgical reinforcement of this.  All questions were answered.  A goal sheet and support group handout were given to the patient.    Once the patient has completed the requirements in their task list and there are no further recommendations, the pt will be allowed to see the surgeon of her choice for consultation on the laparoscopic gastric sleeve surgery. Patient verbalizes understanding of the process to surgery and expectations for the postoperative period including the need for lifelong lifestyle changes, vitamin supplementation, and laboratory monitoring.     Sincerely,    Zeenat Lowe PA-C    I spent a total of 30 minutes face to face with Cari during today's office " visit. Over 50% of this time was spent counseling the patient and/or coordinating care.

## 2017-11-22 NOTE — MR AVS SNAPSHOT
"                  MRN:[id number]                      After Visit Summary   11/22/2017    Cari Barajas    MRN: [id number]           Visit Information        Provider Department      11/22/2017 8:00 AM Marisela Landin RD [facility] Surgical Weight Management        Your next 10 appointments already scheduled     Dec 04, 2017  9:00 AM CST   (Arrive by 8:45 AM)   New Patient Visit with Jose F Crockett MD   [facility] Medical [facility] Management ([facility])    [address] [place] [id number]   [phone]            Dec 18, 2017  9:00 AM CST   (Arrive by 8:45 AM)   NUTRITION VISIT with Marisela Landin RD   [facility] ([facility])    [address] [place] [id number]   [phone]            Mar 09, 2018 11:00 AM CST   (Arrive by 10:45 AM)   Bariatric Surgery Evaluation Interview with Tanya Alarcon, PhD   [facility] ([facility])    [address] [place] [id number]   [phone]            Mar 09, 2018 12:00 PM CST   (Arrive by 11:45 AM)   Bariatric Surgery Evaluation Testing with Tanya Alarcon, PhD   [facility] ([facility])    [address] [place] [id number]   [phone]              Care Instructions    GOALS:  Relating To Eating:  - Eat slowly (20-30 minutes per meal), chewing foods well (25 chews per bite/applesauce consistency).  - 9\" Plate method (1/2 plate non-starchy vegetables/fruit, 1/4 plate lean protein, 1/4 plate whole grain starch - no more than 1/2 cup carb/meal). Okay to have a meal replacement protein drink (~200 calorie or less, at least 20 grams of protein, and less than 10 grams of sugar).   - Eat 3 meals per day.  - Avoid snacking.    Relating to beverages:  - Reduce caffeine/carbonation/calorie " containing beverages. Eliminate soda intake and reduce juice intake.   - Drink 64 ounces of calorie-free/caffeine-free fluids per day.    Relating to dietary supplements:  -Start a multivitamin containing iron daily.    April Landin RD, LD  Voicemail: [phone]  Appointments: [phone]           [facility] Information     [employer] gives you secure access to your electronic health record. If you see a primary care provider, you can also send messages to your care team and make appointments. If you have questions, please call your primary care clinic.  If you do not have a primary care provider, please call [phone] and they will assist you.      [employer] is an electronic gateway that provides easy, online access to your medical records. With [employer], you can request a clinic appointment, read your test results, renew a prescription or communicate with your care team.     To access your existing account, please contact your [facility] or call [phone] for assistance.        Care EveryWhere ID     This is your Care EveryWhere ID. This could be used by other organizations to access your [place] medical records  [id number]        Equal Access to Services     BRENDA VOGT : Godwin Arndt, scott leigh, derrick dominguez. So [facility] [phone].    ATENCIÓN: Si habla español, tiene a dobbs disposición servicios gratuitos de asistencia lingüística. Llame al [phone].    We comply with applicable federal civil rights laws and [place] laws. We do not discriminate on the basis of race, color, national origin, age, disability, sex, sexual orientation, or gender identity.

## 2017-11-22 NOTE — PATIENT INSTRUCTIONS
"Make appt with Tanya Alarcon for bariatric eval or call [phone] to schedule with   See dietitian in 1 month (end of Dec)  Make appt with Medical weight management MD for new consult to help with losing 20 lbs prior to sleeve  Lab today first floor  [email or web address] to watch bariatric seminar  Set up PCP  Quit smoking referral entered  Sleep referral entered    Bariatric Task List  Status:  Is patient a candidate for bariatric surgery?:  Yes -     Cleared to schedule surgeon consult?:    -     Status:  surgery evaluation in process -     Surgeon: Dr Rosas -     Tentative surgery month/year: May 2018 -        Insurance: Insurance:  [employer] -        Patient Info: Initial Weight:  322 lb 8 oz -     Date of Initial Weight/Height:  11/22/2017 -     Goal Weight (lbs):  302 -     Required Weight Loss:  20 -     Surgery Type:  sleeve gastrectomy -     Multidisciplinary Meeting:    -        Dietician Visits: Structured weight loss required by insurance?:  Yes -     Dietician Visit 1:  Completed -     Dietician Visit 2:  Needed -     Dietician Visit 3:  Needed -     Dietician Visit 4:  Needed -     Dietician Visit 5:  Needed -     Dietician Visit 6:  Needed -        Psychological Evaluation: Psych eval:  Needed -        Lab Work: Complete Blood Count:  Needed -     Comprehensive Metabolic Panel:  Needed -     Vitamin D:  Needed -     Hgb A1c:  Needed -     PTH:  Needed -     Nicotine Testing:  Needed -        Consults/ Clearance: Sleep Medicine:  Needed - known ROCCO, not using CPAP, [facility] referral entered   Medical Weight Management: Needed -        PCP: Establish care with PCP:  Needed -     PCP letter of support:  Needed -        Smoking: Quit tobacco use (3 months smoke free)?:  Needed -     Quit date:    -  TBD      Patient Education:  Information Session:  Needed -     Given \"Making your decision\" handout?:  Yes -     Given support group information?:  Yes -     Support plan in place?:  Completed -     Research consents " signed?:  Yes -        Final Tasks:  Before surgery online class:  Needed -     Before surgery online class website link:  [email or web address]   After surgery online class:  Needed -     After surgery online class website link:  [email or web address]   Nurse visit for weigh-in and information:  Needed -     Pre-assessment clinic visit with anesthesia team for H&P:  Needed -     Final labs (Hgb, plt, T&S, UA):  Needed -        Notes:   -

## 2017-11-22 NOTE — LETTER
"2017       RE: Cari Baarjas  [address]  [address]   [facility] [address]     Dear Colleague,    Thank you for referring your patient, Cari Barajas, to the [facility] SURGICAL [facility]NA[facility] at [facility]. Please see a copy of my visit note below.    New Bariatric Surgery Consultation Note    RE: Cari Barajas  MR#: [id number]  : 1985      Referring provider: No flowsheet data found.    Chief Complaint/Reason for visit: evaluation for possible weight loss surgery    Dear No Ref-Primary, Physician (General),    I had the pleasure of seeing your patient, Cari Barajas, to evaluate her obesity and consider her for possible weight loss surgery. As you know, Cari Barajas is 32 year old.  She has a height of 5' 7.5\", a weight of 322 lbs 8 oz, and calculated Body mass index is 49.77 kg/(m^2).    Went through bariatric process in  in [place] but she wasn't covered by insurance.  She looked into again in  and was told she had to start over with the 6 months and she didn't want to.  She moved to [place] in September.     HISTORY OF PRESENT ILLNESS:  Weight Loss History Reviewed with Patient 2017   How long have you been overweight? Since puberty   What is the most that you have ever weighed? 360   What is the most weight you have lost? 10   I have tried the following methods to lose weight Watching portions or calories, Exercise, Weight Watchers, Pre packaged meals ex: Nutrisystem, Slimfast, OTC Medications   I have tried the following weight loss medications? (Check all that apply) Fen-Phen   Have you ever had weight loss surgery? No     CO-MORBIDITIES OF OBESITY INCLUDE:     2017   I have the following co-morbidities associated with obesity: Sleep Apnea, Asthma   Do you use a CPAP? Yes   She doesn't have a CPAP machine currently.     PAST MEDICAL HISTORY:  Past Medical History:   Diagnosis Date     Uncomplicated asthma        PAST SURGICAL " HISTORY:  History reviewed. No pertinent surgical history.    FAMILY HISTORY:   Family History   Problem Relation Age of Onset     DIABETES Father      Breast Cancer Paternal Grandmother      Asthma Brother      Asthma Brother      Asthma Brother        SOCIAL HISTORY:   Social History Questions Reviewed With Patient 11/18/2017   Which best describes your employment status (select all that apply) I work full-time, I work alternate shifts   If you work, what is your occupation?    Which best describes your marital status:    Do you have children? No   Who do you have in your support network that can be available to help you for the first 2 weeks after surgery? My cousin   Who can you count on for support throughout your weight loss surgery journey? Myself, my parents    Can you afford 3 meals a day?  Yes   Can you afford 50-60 dollars a month for vitamins? Yes   Works at a residential house caring for vulnerable adults.      HABITS:     11/18/2017   How often do you drink alcohol? Monthly or less   If you do drink alcohol, how many drinks might you have in a day? (one drink = 5 oz. wine, 1 can/bottle of beer, 1 shot liquor) 3 or 4   Do you currently use any of the following Nicotine products? cigarettes   Have you ever used any of the following nicotine products? Cigarettes   Have you or are you currently using street drugs or prescription strength medication for which you do not have a prescription for? No   Do you have a history of chemical dependency (alcohol or drug abuse)? No   Currently smokes 4-5 cigs per day    PSYCHOLOGICAL HISTORY:   Psychological History Reviewed With Patient 11/18/2017   Have you ever attempted suicide? Never.   Have you had thoughts of suicide in the past year? No   Have you ever been hospitalized for mental illness or a suicide attempt? In the last 1 to 5 years.   Do you have a history of chronic pain? No   Have you ever been diagnosed with fibromyalgia?  No   Are you currently seeing a therapist or counselor?  No   Are you currently seeing a psychiatrist? No   Hospitalized January 2017 for PTSD. No suicide attempt.  She was given medications for depression and she took them for a short time.  She reports this was situational after her brother passed away.      ROS:     11/18/2017   Skin:  None of the above   HEENT: Headaches   Musculoskeletal: Joint Pain, Back pain   Cardiovascular: Shortness of breath with activity   Pulmonary: Shortness of breath with activity, Snoring, Awaken from sleep to catch your breath, People have told me I stop breathing while asleep, Experience morning headaches   Gastrointestinal: None of the above   Genitourinary: Stress incontinence (losing urine when coughing, sneezing, etc.)   Hematological: None of the above   Neurological: Migraine headaches   Female only: Excessive menstrual bleeding, Irregular menstrual cycles       EATING BEHAVIORS:     11/18/2017   Have you or anyone else thought that you had an eating disorder? No   Do you currently binge eat (eat a large amount of food in a short time)? No   Are you an emotional eater? Yes   Do you get up to eat after falling asleep? No       EXERCISE:     11/18/2017   How often do you exercise? Less than 1 time per week   What is the duration of your exercise (in minutes)? 10 Minutes   What types of exercise do you do? walking   What keeps you from being more active?  Pain, I should be more active but I just have not gotten around to it, Shortness of breath, Too tired       MEDICATIONS:  Current Outpatient Prescriptions   Medication     albuterol (VENTOLIN HFA) 108 (90 BASE) MCG/ACT Inhaler     albuterol (PROAIR HFA/PROVENTIL HFA/VENTOLIN HFA) 108 (90 BASE) MCG/ACT Inhaler     No current facility-administered medications for this visit.        ALLERGIES:  No Known Allergies    LABS/IMAGING/MEDICAL RECORDS REVIEW:    PHYSICAL EXAM:  /78  Pulse 51  Temp 98.3  F (36.8  C) (Oral)  Ht 5'  "7.5\"  Wt (!) 322 lb 8 oz  LMP 10/12/2017  SpO2 100%  BMI 49.77 kg/m2  General: NAD  Neurologic: A & O x 3, gait normal  Head: normocephalic, atraumatic  HEENT: PERRL, EOMI.   Respiratory: respirations unlabored  Abdomen: Obese, Soft NT ND   Extremities: No LE swelling   Skin: warm and dry.  No rashes on exposed skin  Psychiatric: Mentation and Affect appear normal    In summary, Cari Barajas has Class III obesity with a body mass index of Body mass index is 49.77 kg/(m^2). kg/m2 and the comorbidities stated above. She completed an informational seminar and is a candidate for the laparoscopic gastric sleeve.  She will have to complete the following pre-requisites:  Make appt with Tanya Alarcon for bariatric eval or call [phone] to schedule with   [email or web address]  Set up PCP  Quit smoking referral entered  Sleep referral entered  Make appt with Medical weight management MD for new consult to help with losing 20 lbs prior to sleeve  Bariatric Task List  Status:  Is patient a candidate for bariatric surgery?:  Yes -     Cleared to schedule surgeon consult?:    -     Status:  surgery evaluation in process -     Surgeon: Dr Rosas -     Tentative surgery month/year: May 2018 -        Insurance: Insurance:  [employer] -        Patient Info: Initial Weight:  322 lb 8 oz -     Date of Initial Weight/Height:  11/22/2017 -     Goal Weight (lbs):  302 -     Required Weight Loss:  20 -     Surgery Type:  sleeve gastrectomy -     Multidisciplinary Meeting:    -        Dietician Visits: Structured weight loss required by insurance?:  Yes -     Dietician Visit 1:  Completed -     Dietician Visit 2:  Needed -     Dietician Visit 3:  Needed -     Dietician Visit 4:  Needed -     Dietician Visit 5:  Needed -     Dietician Visit 6:  Needed -        Psychological Evaluation: Psych eval:  Needed -        Lab Work: Complete Blood Count:  Needed -     Comprehensive Metabolic Panel:  Needed -     Vitamin D:  Needed -     Hgb A1c:  " "Needed -     PTH:  Needed -     Nicotine Testing:  Needed -        Consults/ Clearance: Sleep Medicine:  Needed - known ROCCO, not using CPAP, [facility] referral entered   Medical Weight Management: Needed -        PCP: Establish care with PCP:  Needed -     PCP letter of support:  Needed -        Smoking: Quit tobacco use (3 months smoke free)?:  Needed -     Quit date:    -  TBD      Patient Education:  Information Session:  Needed -     Given \"Making your decision\" handout?:  Yes -     Given support group information?:  Yes -     Support plan in place?:  Completed -     Research consents signed?:  Yes -        Final Tasks:  Before surgery online class:  Needed -     Before surgery online class website link:  [email or web address]   After surgery online class:  Needed -     After surgery online class website link:  [email or web address]   Nurse visit for weigh-in and information:  Needed -     Pre-assessment clinic visit with anesthesia team for H&P:  Needed -     Final labs (Hgb, plt, T&S, UA):  Needed -        Notes:   -       Today in the office we discussed gastric sleeve surgery. Preoperative, perioperative, and postoperative processes, management, and follow up were addressed.  Risks and benefits were outlined including the risk of death, staple line leak (1-2%), PE, DVT, ulcer, worsening GERD, N/V, stricture, hernia, wound infection, weight regain, and vitamin deficiencies. I emphasized exercise and activity along with appropriate food choice as the main foundation for weight loss with surgery providing surgical reinforcement of this.  All questions were answered.  A goal sheet and support group handout were given to the patient.    Once the patient has completed the requirements in their task list and there are no further recommendations, the pt will be allowed to see the surgeon of her choice for consultation on the laparoscopic gastric sleeve surgery. Patient verbalizes " understanding of the process to surgery and expectations for the postoperative period including the need for lifelong lifestyle changes, vitamin supplementation, and laboratory monitoring.     Sincerely,    Zeenat Lowe PA-C    I spent a total of 30 minutes face to face with Cari during today's office visit. Over 50% of this time was spent counseling the patient and/or coordinating care.            Again, thank you for allowing me to participate in the care of your patient.      Sincerely,    Zeenat Lowe PA-C

## 2017-11-22 NOTE — LETTER
Date:November 28, 2017      Patient was self referred, no letter generated. Do not send.        [facility] Information

## 2017-11-22 NOTE — PROGRESS NOTES
"New Bariatric Nutrition Consultation Note    Reason For Visit: Nutrition Assessment    Cari Barajas is a 32 year old presenting today for new bariatric nutrition consult. Pt is interested in laparoscopic sleeve gastrectomy with Dr. Rosas expected surgery in May 2018. This is pt's first of 6 required nutrition visits prior to surgery. Pt referred by Zeenat Lowe PA-C (11/22/17).       She is interested in having weight loss surgery for the following reasons:  Wants to be healthy and needs help lose weight. 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     Support System Reviewed With Patient 11/18/2017   Who do you have in your support network that can be available to help you for the first 2 weeks after surgery? My cousin   Who can you count on for support throughout your weight loss surgery journey? Myself, my parents        ANTHROPOMETRICS:  Initial Estimated body mass index is 49.77 kg/(m^2) as calculated from the following:    Height as of an earlier encounter on 11/22/17: 1.715 m (5' 7.5\").    Initial Weight on 11/22/17: 322.5 lbs    Required weight loss goal pre-op: -20 lbs from initial consult weight (goal weight 302.5 lbs or less before surgery)       11/18/2017   I have tried the following methods to lose weight Watching portions or calories, Exercise, Weight Watchers, Pre packaged meals ex: Nutrisystem, Slimfast, OTC Medications    Most weight lost at one time: 10lbs with dietitian when going through another programs bariatric surgery process       Weight Loss Questions Reviewed With Patient 11/18/2017   How long have you been overweight? Since puberty       SUPPLEMENT INFORMATION:  None    NUTRITION HISTORY:  Recall Diet Questions Reviewed With Patient 11/18/2017   Describe what you typically consume for breakfast (typical or most recent): nothing   Describe what you typically consume for lunch (typical or most recent): fast food   Describe what you typically consume for supper (typical or " most recent): cooked meals   Describe what you typically consume as snacks (typical or most recent): 2x/day; Doughnuts, Candy, Chips, Pop   How many ounces of water, or other low calorie drinks, do you drink daily (8 oz=1 glass)? 24 oz   How many ounces of caffeine (coffee, tea, pop) do you drink daily (8 oz=1 glass)? 48 oz - regular pop   How many ounces of carbonated (pop, beer, sparkling water) drinks do you drinky daily (8 oz=1 glass)? 48 oz   How many ounces of juice, pop, sweet tea, sports drinks, protein drinks, other sweetened drinks, do you drink daily (8 oz=1 glass)? 48 oz   How many ounces of milk do you drink daily (8 oz=1 glass) 8 oz   Please indicate the type of milk: 2%   How often do you drink alcohol? Monthly or less   If you do drink alcohol, how many drinks might you have in a day? (one drink = 5 oz. wine, 1 can/bottle of beer, 1 shot liquor) 3 or 4       Eating Habits 11/18/2017   Do you have any dietary restrictions? Lactose Intolerance    Do you currently binge eat (eat a large amount of food in a short time)? No   Are you an emotional eater? Yes   Do you get up to eat after falling asleep? No   What foods do you crave? pizza, fried chicken, CANDY        ADDITIONAL INFORMATION:  Pt will need to quit smoking prior to surgery.    Dining Out History Reviewed With Patient 11/18/2017   How often do you dine out? A couple of times a week.   Where do you dine out? (select all that apply) sit-down restaurants, fast food chains, take out   What types of food do you order when you dine out? fried,burgers,Greenlandic food, veitnamese       Physical Activity Reviewed With Patient 11/18/2017   How often do you exercise? Less than 1 time per week   What is the duration of your exercise (in minutes)? 10 Minutes   What types of exercise do you do? walking   What keeps you from being more active?  Pain, I should be more active but I just have not gotten around to it, Shortness of breath, Too tired    Pt is not  "interested in adding planned exercise at this time because she is very active at work. Pt walks all day.        NUTRITION DIAGNOSIS:  Obesity r/t long history of self-monitoring deficit and excessive energy intake aeb BMI >30.    INTERVENTION:  Intervention Provided/Education Provided on post-op diet guidelines, vitamins/minerals essential post-operatively, GI anatomy of bariatric surgeries, ways to help prepare for post-op diet guidelines pre-operatively, portion/calorie-control, mindful eating, and exercise. Gave encouragement and support. Provided pt with \"The Plate Method\" handout, \"Sources of Protein\" handout, list of goals, and RD contact information.      Questions Reviewed With Patient 11/18/2017   How ready are you to make changes regarding your weight? Number 1 = Not ready at all to make changes up to 10 = very ready. 10   How confident are you that you can change? 1 = Not confident that you will be successful making changes up to 10 = very confident. 10       Patient Understanding: good  Expected Compliance: good   Follow-up: Quitting smoking    GOALS:  Relating To Eating:  - Eat slowly (20-30 minutes per meal), chewing foods well (25 chews per bite/applesauce consistency).  - 9\" Plate method (1/2 plate non-starchy vegetables/fruit, 1/4 plate lean protein, 1/4 plate whole grain starch - no more than 1/2 cup carb/meal). Okay to have a meal replacement protein drink (~200 calorie or less, at least 20 grams of protein, and less than 10 grams of sugar).   - Eat 3 meals per day.  - Avoid snacking.    Relating to beverages:  - Reduce caffeine/carbonation/calorie containing beverages. Eliminate soda intake and reduce juice intake.   - Drink 64 ounces of calorie-free/caffeine-free fluids per day.    Relating to dietary supplements:  -Start a multivitamin containing iron daily.    Follow-Up:   1 month or PRN    Time spent with patient: 30 minutes    Marisela Landin RD, LD  Pager: [phone]      "

## 2017-11-22 NOTE — MR AVS SNAPSHOT
After Visit Summary   11/22/2017    Cari Barajas    MRN: [id number]           Patient Information     Date Of Birth          1985        Visit Information        Provider Department      11/22/2017 7:00 AM Zeenat Lowe PA-C [facility]        Today's Diagnoses     Sleep apnea, unspecified type    -  1    Morbid obesity (H)        Tobacco abuse          Care Instructions    Make appt with Tanya Alarcon for bariatric eval or call [phone] to schedule with   See dietitian in 1 month (end of Dec)  Make appt with Medical weight management MD for new consult to help with losing 20 lbs prior to sleeve  Lab today first floor  [email or web address] to watch bariatric seminar  Set up PCP  Quit smoking referral entered  Sleep referral entered    Bariatric Task List  Status:  Is patient a candidate for bariatric surgery?:  Yes -     Cleared to schedule surgeon consult?:    -     Status:  surgery evaluation in process -     Surgeon: Dr Rosas -     Tentative surgery month/year: May 2018 -        Insurance: Insurance:  [employer] -        Patient Info: Initial Weight:  322 lb 8 oz -     Date of Initial Weight/Height:  11/22/2017 -     Goal Weight (lbs):  302 -     Required Weight Loss:  20 -     Surgery Type:  sleeve gastrectomy -     Multidisciplinary Meeting:    -        Dietician Visits: Structured weight loss required by insurance?:  Yes -     Dietician Visit 1:  Completed -     Dietician Visit 2:  Needed -     Dietician Visit 3:  Needed -     Dietician Visit 4:  Needed -     Dietician Visit 5:  Needed -     Dietician Visit 6:  Needed -        Psychological Evaluation: Psych eval:  Needed -        Lab Work: Complete Blood Count:  Needed -     Comprehensive Metabolic Panel:  Needed -     Vitamin D:  Needed -     Hgb A1c:  Needed -     PTH:  Needed -     Nicotine Testing:  Needed -        Consults/ Clearance: Sleep Medicine:  Needed - known ROCCO, not using CPAP, [facility] referral entered  "  Medical Weight Management: Needed -        PCP: Establish care with PCP:  Needed -     PCP letter of support:  Needed -        Smoking: Quit tobacco use (3 months smoke free)?:  Needed -     Quit date:    -  TBD      Patient Education:  Information Session:  Needed -     Given \"Making your decision\" handout?:  Yes -     Given support group information?:  Yes -     Support plan in place?:  Completed -     Research consents signed?:  Yes -        Final Tasks:  Before surgery online class:  Needed -     Before surgery online class website link:  [email or web address]   After surgery online class:  Needed -     After surgery online class website link:  [email or web address]   Nurse visit for weigh-in and information:  Needed -     Pre-assessment clinic visit with anesthesia team for H&P:  Needed -     Final labs (Hgb, plt, T&S, UA):  Needed -        Notes:   -                 Follow-ups after your visit        Additional Services     CALL IT QUITS (QUITPLAN) REFERRAL           SLEEP EVALUATION & MANAGEMENT REFERRAL - [facility] / [facility]  [phone] (Age 2 and up)       Please be aware that coverage of these services is subject to the terms and limitations of your health insurance plan.  Call member services at your health plan with any benefit or coverage questions.      Please bring the following to your appointment:    >>   List of current medications   >>   This referral request   >>   Any documents/labs given to you for this referral                      Your next 10 appointments already scheduled     Dec 04, 2017  9:00 AM CST   (Arrive by 8:45 AM)   New Patient Visit with Jose F Crockett MD   [facility] ([facility])    [address] [place] [id number] [phone]            Dec 18, 2017  9:00 AM CST   (Arrive by 8:45 AM)   NUTRITION VISIT with " Marisela Landin RD   [facility] Surgical [facility] ([facility])    [address]  4th [facility] [id number] [phone]            Mar 09, 2018 11:00 AM CST   (Arrive by 10:45 AM)   Bariatric Surgery Evaluation Interview with Tanya Alarcon, PhD   [facility] ([facility])    [address] [id number] [phone]            Mar 09, 2018 12:00 PM CST   (Arrive by 11:45 AM)   Bariatric Surgery Evaluation Testing with Tanya Alarcon, PhD   [facility] ([facility])    [address] [id number] [phone]              Future tests that were ordered for you today     Open Future Orders        Priority Expected Expires Ordered    Nicotine and Cotinine Urine Routine  2/20/2018 11/22/2017    Hemoglobin A1c Routine  2/20/2018 11/22/2017    SLEEP EVALUATION & MANAGEMENT REFERRAL - [facility] [place] [facility] / [facility]  [phone] (Age 2 and up) Routine  11/22/2018 11/22/2017            Who to contact     Please call your clinic at [phone] to:    Ask questions about your health    Make or cancel appointments    Discuss your medicines    Learn about your test results    Speak to your doctor   If you have compliments or concerns about an experience at your clinic, or if you wish to file a complaint, please contact [facility] Patient Relations at [phone] or email us at [email or web address]         Additional Information About Your Visit        MyChart Information     [employer] gives you secure access to your electronic health record. If you see a primary care provider, you can also send messages to your care team and make appointments. If you have questions, please call your primary care clinic.  If you do not have a primary  "care provider, please call [phone] and they will assist you.      [employer] is an electronic gateway that provides easy, online access to your medical records. With [employer], you can request a clinic appointment, read your test results, renew a prescription or communicate with your care team.     To access your existing account, please contact your [facility] or call [phone] for assistance.        Care EveryWhere ID     This is your Care EveryWhere ID. This could be used by other organizations to access your [place] medical records  [id number]        Your Vitals Were     Pulse Temperature Height Last Period Pulse Oximetry BMI (Body Mass Index)    51 98.3  F (36.8  C) (Oral) 1.715 m (5' 7.5\") 10/12/2017 100% 49.77 kg/m2       Blood Pressure from Last 3 Encounters:   11/22/17 140/78   11/12/17 110/78    Weight from Last 3 Encounters:   11/22/17 (!) 146.3 kg (322 lb 8 oz)   11/12/17 (!) 143.8 kg (317 lb)              We Performed the Following     CALL IT QUITS (QUITPLAN) REFERRAL     CBC with platelets     Comprehensive metabolic panel     Parathyroid Hormone Intact     Vitamin D Deficiency          Today's Medication Changes          These changes are accurate as of: 11/22/17  8:06 AM.  If you have any questions, ask your nurse or doctor.               Stop taking these medicines if you haven't already. Please contact your care team if you have questions.     fluticasone 220 MCG/ACT Inhaler   Commonly known as:  FLOVENT HFA   Stopped by:  Zeenat Lowe PA-C                    Primary Care Provider    Physician No Ref-Primary       NO REF-PRIMARY PHYSICIAN        Equal Access to Services     [facility]: Hadii valentina Arndt, waaxda luqadaha, [place]. So [facility] [phone].    ATENCIÓN: Si habla español, tiene a dobbs disposición servicios gratuitos de asistencia lingüística. Llame al " [phone].    We comply with applicable federal civil rights laws and [place] laws. We do not discriminate on the basis of race, color, national origin, age, disability, sex, sexual orientation, or gender identity.            Thank you!     Thank you for choosing [facility] SURGICAL WEIGHT MANAGEMENT  for your care. Our goal is always to provide you with excellent care. Hearing back from our patients is one way we can continue to improve our services. Please take a few minutes to complete the written survey that you may receive in the mail after your visit with us. Thank you!             Your Updated Medication List - Protect others around you: Learn how to safely use, store and throw away your medicines at www.disposemymeds.org.          This list is accurate as of: 11/22/17  8:06 AM.  Always use your most recent med list.                   Brand Name Dispense Instructions for use Diagnosis    * VENTOLIN  (90 BASE) MCG/ACT Inhaler   Generic drug:  albuterol           * albuterol 108 (90 BASE) MCG/ACT Inhaler    PROAIR HFA/PROVENTIL HFA/VENTOLIN HFA    1 Inhaler    Inhale 2 puffs into the lungs every 6 hours    Mild persistent asthma with exacerbation       * Notice:  This list has 2 medication(s) that are the same as other medications prescribed for you. Read the directions carefully, and ask your doctor or other care provider to review them with you.

## 2017-11-22 NOTE — NURSING NOTE
"(   Chief Complaint   Patient presents with     Weight Problem     NBS, BMI 49.4 self referred/ medical records in [place]    )    ( Weight: (!) 322 lb 8 oz )  ( Height: 5' 7.5\" )  ( BMI (Calculated): 49.87 )  ( Initial Weight: 322 lb 8 oz )  ( Cumulative weight loss (lbs): 0 )  (   )  (   )  ( Waist Circumference (cm): 142 cm )  (   )    ( BP: 140/78 )  (   )  ( Temp: 98.3  F (36.8  C) )  ( Temp src: Oral )  ( Pulse: 51 )  (   )  ( SpO2: 100 % )    (   Patient Active Problem List   Diagnosis     Mild persistent asthma with exacerbation    )  (   Current Outpatient Prescriptions   Medication Sig Dispense Refill     albuterol (VENTOLIN HFA) 108 (90 BASE) MCG/ACT Inhaler        albuterol (PROAIR HFA/PROVENTIL HFA/VENTOLIN HFA) 108 (90 BASE) MCG/ACT Inhaler Inhale 2 puffs into the lungs every 6 hours 1 Inhaler 3    )  ( Diabetes Eval:    )    ( Pain Eval:  Data Unavailable )    ( Wound Eval:       )    (   History   Smoking Status     Current Every Day Smoker     Packs/day: 0.50     Years: 10.00     Types: Cigarettes     Start date: 1/1/2000   Smokeless Tobacco     Never Used    )    ( Signed By:  Jacki Mccloud; November 22, 2017; 7:06 AM )    "

## 2017-11-22 NOTE — PATIENT INSTRUCTIONS
"GOALS:  Relating To Eating:  - Eat slowly (20-30 minutes per meal), chewing foods well (25 chews per bite/applesauce consistency).  - 9\" Plate method (1/2 plate non-starchy vegetables/fruit, 1/4 plate lean protein, 1/4 plate whole grain starch - no more than 1/2 cup carb/meal). Okay to have a meal replacement protein drink (~200 calorie or less, at least 20 grams of protein, and less than 10 grams of sugar).   - Eat 3 meals per day.  - Avoid snacking.    Relating to beverages:  - Reduce caffeine/carbonation/calorie containing beverages. Eliminate soda intake and reduce juice intake.   - Drink 64 ounces of calorie-free/caffeine-free fluids per day.    Relating to dietary supplements:  -Start a multivitamin containing iron daily.    April Landin RD, LD  Voicemail: [phone]  Appointments: [phone]    "

## 2017-12-11 PROBLEM — E66.01 MORBID OBESITY (H): Status: ACTIVE | Noted: 2017-12-11

## 2018-01-08 ENCOUNTER — OFFICE VISIT (OUTPATIENT)
Dept: ENDOCRINOLOGY | Facility: CLINIC | Age: 33
End: 2018-01-08
Payer: COMMERCIAL

## 2018-01-08 VITALS — HEIGHT: 68 IN | OXYGEN SATURATION: 100 % | WEIGHT: 293 LBS | BODY MASS INDEX: 44.41 KG/M2

## 2018-01-08 DIAGNOSIS — E66.01 MORBID OBESITY (H): Primary | ICD-10-CM

## 2018-01-08 ASSESSMENT — PAIN SCALES - GENERAL: PAINLEVEL: NO PAIN (0)
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
## 2018-01-08 NOTE — NURSING NOTE
"(   Chief Complaint   Patient presents with     Consult     f/u     )    ( Weight: (!) 319 lb 12.8 oz )  ( Height: 5' 7.5\" )  ( BMI (Calculated): 49.45 )  (   )  (   )  (   )  (   )  (   )  (   )    ( BP:  (unable to obtain) )  (   )  (   )  (   )  (   )  (   )  ( SpO2: 100 % )    (   Patient Active Problem List   Diagnosis     Mild persistent asthma with exacerbation     Morbid obesity (H)    )  (   Current Outpatient Prescriptions   Medication Sig Dispense Refill     albuterol (VENTOLIN HFA) 108 (90 BASE) MCG/ACT Inhaler        albuterol (PROAIR HFA/PROVENTIL HFA/VENTOLIN HFA) 108 (90 BASE) MCG/ACT Inhaler Inhale 2 puffs into the lungs every 6 hours 1 Inhaler 3    )  ( Diabetes Eval:    )    ( Pain Eval:  No Pain (0) )    ( Wound Eval:       )    (   History   Smoking Status     Current Every Day Smoker     Packs/day: 0.50     Years: 10.00     Types: Cigarettes     Start date: 1/1/2000   Smokeless Tobacco     Never Used    )    ( Signed By:  Aiden Mancera; January 8, 2018; 9:57 AM )    "

## 2018-01-08 NOTE — MR AVS SNAPSHOT
After Visit Summary   1/8/2018    Cari Barajas    MRN: [id number]           Patient Information     Date Of Birth          1985        Visit Information        Provider Department      1/8/2018 10:00 AM Jose F Crockett MD [facility] Medical Weight Management        Today's Diagnoses     Morbid obesity (H)    -  1       Follow-ups after your visit        Follow-up notes from your care team     Return in about 3 months (around 4/8/2018).      Your next 10 appointments already scheduled     Mar 09, 2018 11:00 AM CST   (Arrive by 10:45 AM)   Bariatric Surgery Evaluation Interview with Tanay Alarcon, PhD   [facility] ([facility])    [address] [place] [id number] [phone]            Mar 09, 2018 12:00 PM CST   (Arrive by 11:45 AM)   Bariatric Surgery Evaluation Testing with Tanya Alarcon PhD   [facility] ([facility])    [address] [place] [id number] [phone]              Who to contact     Please call your clinic at [phone] to:    Ask questions about your health    Make or cancel appointments    Discuss your medicines    Learn about your test results    Speak to your doctor   If you have compliments or concerns about an experience at your clinic, or if you wish to file a complaint, please contact [facility] Patient Relations at [phone] or email us at [email or web address]         Additional Information About Your Visit        MyChart Information     [facility] gives you secure access to your electronic health record. If you see a primary care provider, you can also send messages to your care team and make appointments. If you have questions, please call your primary care clinic.  If you do not have a primary care provider, please call [phone] and they will assist  "you.      iTaggit is an electronic gateway that provides easy, online access to your medical records. With iTaggit, you can request a clinic appointment, read your test results, renew a prescription or communicate with your care team.     To access your existing account, please contact your [facility] or call [phone] for assistance.        Care EveryWhere ID     This is your Care EveryWhere ID. This could be used by other organizations to access your [place] medical records  [id number]        Your Vitals Were     Height Pulse Oximetry BMI (Body Mass Index)             1.715 m (5' 7.5\") 100% 49.35 kg/m2          Blood Pressure from Last 3 Encounters:   11/22/17 140/78   11/12/17 110/78    Weight from Last 3 Encounters:   01/08/18 (!) 145.1 kg (319 lb 12.8 oz)   11/22/17 (!) 146.3 kg (322 lb 8 oz)   11/12/17 (!) 143.8 kg (317 lb)              Today, you had the following     No orders found for display         Today's Medication Changes          These changes are accurate as of: 1/8/18 10:26 AM.  If you have any questions, ask your nurse or doctor.               Start taking these medicines.        Dose/Directions    Phentermine-Topiramate 7.5-46 MG Cp24   Used for:  Morbid obesity (H)   Started by:  Jose F Crockett MD        Dose:  1 capsule   Take 1 capsule by mouth daily   Quantity:  30 capsule   Refills:  5            Where to get your medicines      Some of these will need a paper prescription and others can be bought over the counter.  Ask your nurse if you have questions.     Bring a paper prescription for each of these medications     Phentermine-Topiramate 7.5-46 MG Cp24                Primary Care Provider Fax #    Physician No Ref-Primary [phone]       No address on file        Equal Access to Services     BRENDA VOGT : Godwin Arndt, scott leigh, derrick dominguez. So wa " [phone].    ATENCIÓN: Si daya torres, tiene a dobbs disposición servicios gratuitos de asistencia lingüística. Mohit gallegos [phone].    We comply with applicable federal civil rights laws and [place] laws. We do not discriminate on the basis of race, color, national origin, age, disability, sex, sexual orientation, or gender identity.            Thank you!     Thank you for choosing [facility] MEDICAL WEIGHT MANAGEMENT  for your care. Our goal is always to provide you with excellent care. Hearing back from our patients is one way we can continue to improve our services. Please take a few minutes to complete the written survey that you may receive in the mail after your visit with us. Thank you!             Your Updated Medication List - Protect others around you: Learn how to safely use, store and throw away your medicines at www.disposemymeds.org.          This list is accurate as of: 1/8/18 10:26 AM.  Always use your most recent med list.                   Brand Name Dispense Instructions for use Diagnosis    * VENTOLIN  (90 BASE) MCG/ACT Inhaler   Generic drug:  albuterol           * albuterol 108 (90 BASE) MCG/ACT Inhaler    PROAIR HFA/PROVENTIL HFA/VENTOLIN HFA    1 Inhaler    Inhale 2 puffs into the lungs every 6 hours    Mild persistent asthma with exacerbation       Phentermine-Topiramate 7.5-46 MG Cp24     30 capsule    Take 1 capsule by mouth daily    Morbid obesity (H)       * Notice:  This list has 2 medication(s) that are the same as other medications prescribed for you. Read the directions carefully, and ask your doctor or other care provider to review them with you.
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
## 2018-01-11 ENCOUNTER — ALLIED HEALTH/NURSE VISIT (OUTPATIENT)
Dept: SURGERY | Facility: CLINIC | Age: 33
End: 2018-01-11
Payer: COMMERCIAL

## 2018-01-11 VITALS — HEIGHT: 68 IN | WEIGHT: 293 LBS | BODY MASS INDEX: 44.41 KG/M2

## 2018-01-11 NOTE — PROGRESS NOTES
"New Bariatric Nutrition Consultation Note    Reason For Visit: Nutrition Assessment    Cari Barajas is a 32 year old presenting today for new bariatric nutrition consult. Pt is interested in laparoscopic sleeve gastrectomy with Dr. Rosas expected surgery in May 2018. This is pt's 2nd of 6 required nutrition visits prior to surgery. Pt referred by Zeenat Lowe PA-C (11/22/17).       She is interested in having weight loss surgery for the following reasons:  Wants to be healthy and needs help lose weight. 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     ANTHROPOMETRICS:  Initial Estimated body mass index is 49.77 kg/(m^2) as calculated from the following:    Height as of an earlier encounter on 11/22/17: 1.715 m (5' 7.5\").    Initial Weight on 11/22/17: 322.5 lbs    Required weight loss goal pre-op: -20 lbs from initial consult weight (goal weight 302.5 lbs or less before surgery)    SUPPLEMENT INFORMATION:  None ***     NUTRITION HISTORY:    Recall Diet Questions Reviewed With Patient 11/18/2017   Describe what you typically consume for breakfast (typical or most recent): nothing   Describe what you typically consume for lunch (typical or most recent): fast food   Describe what you typically consume for supper (typical or most recent): cooked meals   Describe what you typically consume as snacks (typical or most recent): 2x/day; Doughnuts, Candy, Chips, Pop   How many ounces of water, or other low calorie drinks, do you drink daily (8 oz=1 glass)? 24 oz   How many ounces of caffeine (coffee, tea, pop) do you drink daily (8 oz=1 glass)? 48 oz - regular pop       Eating Habits 11/18/2017   Do you have any dietary restrictions? Lactose Intolerance    What foods do you crave? pizza, fried chicken, CANDY      Progress toward previous goals:  Relating To Eating:  - Eat slowly (20-30 minutes per meal), chewing foods well (25 chews per bite/applesauce consistency). ***  - 9\" Plate method (1/2 plate non-starchy " "vegetables/fruit, 1/4 plate lean protein, 1/4 plate whole grain starch - no more than 1/2 cup carb/meal). Okay to have a meal replacement protein drink (~200 calorie or less, at least 20 grams of protein, and less than 10 grams of sugar). ***  - Eat 3 meals per day. ***   - Avoid snacking. ***    Relating to beverages:  - Reduce caffeine/carbonation/calorie containing beverages. Eliminate soda intake and reduce juice intake.  ***  - Drink 64 ounces of calorie-free/caffeine-free fluids per day. ***    Relating to dietary supplements:  -Start a multivitamin containing iron daily. ***     ADDITIONAL INFORMATION:  Pt will need to quit smoking prior to surgery.  Pt is not interested in adding planned exercise at this time because she is very active at work. Pt walks all day.     NUTRITION DIAGNOSIS:  Obesity r/t long history of self-monitoring deficit and excessive energy intake aeb BMI >30.    INTERVENTION:  Intervention Provided/Education Provided on post-op diet guidelines, vitamins/minerals essential post-operatively, GI anatomy of bariatric surgeries, ways to help prepare for post-op diet guidelines pre-operatively, portion/calorie-control, mindful eating, and exercise. Gave encouragement and support. Provided pt with \"The Plate Method\" handout, \"Sources of Protein\" handout, list of goals, and RD contact information.        Patient Understanding: good  Expected Compliance: good   Follow-up: Quitting smoking    GOALS:  Relating To Eating:  - Eat slowly (20-30 minutes per meal), chewing foods well (25 chews per bite/applesauce consistency).  - 9\" Plate method (1/2 plate non-starchy vegetables/fruit, 1/4 plate lean protein, 1/4 plate whole grain starch - no more than 1/2 cup carb/meal). Okay to have a meal replacement protein drink (~200 calorie or less, at least 20 grams of protein, and less than 10 grams of sugar).   - Eat 3 meals per day.  - Avoid snacking.    Relating to beverages:  - Reduce " caffeine/carbonation/calorie containing beverages. Eliminate soda intake and reduce juice intake.   - Drink 64 ounces of calorie-free/caffeine-free fluids per day.    Relating to dietary supplements:  -Start a multivitamin containing iron daily.    Follow-Up:   1 month or PRN    Time spent with patient: 30 minutes    Marisela Landin RD, LD  Pager: [phone]

## 2018-01-11 NOTE — MR AVS SNAPSHOT
"                  MRN:[id number]                      After Visit Summary   1/11/2018    Cari Barajas    MRN: [id number]           Visit Information        Provider Department      1/11/2018 10:30 AM Marisela Landin RD [facility] Surgical Weight Management        Your next 10 appointments already scheduled     Mar 09, 2018 11:00 AM CST   (Arrive by 10:45 AM)   Bariatric Surgery Evaluation Interview with Tanya Alarcon, PhD   [facility] ([facility])    [address] [place] [id number]   [phone]            Mar 09, 2018 12:00 PM CST   (Arrive by 11:45 AM)   Bariatric Surgery Evaluation Testing with Tanya Alarcon, PhD   [facility] ([facility])    [address] [place] [id number]   [phone]              Care Instructions    GOALS:  Relating To Eating:  - Eat slowly (20-30 minutes per meal), chewing foods well (25 chews per bite/applesauce consistency).  - 9\" Plate method (1/2 plate non-starchy vegetables/fruit, 1/4 plate lean protein, 1/4 plate whole grain starch - no more than 1/2 cup carb/meal).   - Eat 3 meals per day. (Okay to have a meal replacement protein drink (~200 calorie or less, at least 20 grams of protein, and less than 10 grams of sugar).   - Avoid snacking.    Relating to beverages:  - Reduce caffeine/carbonation/calorie containing beverages. Eliminate soda intake and reduce juice intake.   - Drink 64 ounces of calorie-free/caffeine-free fluids per day.    Relating to dietary supplements:  -Start a multivitamin containing iron daily.    Relating to exercise:  - Walk 3 times per week, 20-30 minutes each time at mall.       Follow the Modified Liquid Diet for weight loss:  Breakfast: Protein Shake  Lunch: 3 oz lean protein + non-starchy vegetables  Supper: 3 oz lean protein + non-starchy vegetables  Snack: non-starchy vegetables (no " calorie-containing dips/condiments)  Beverages: at least 48-64 oz water between meals daily    *Protein Shake Criteria: no more than 200 Calories, at least 20 grams of protein, and less than 10 grams of sugar     Meal Replacement Shake Options:   [facility] Meal Replacement (250 Calories, 35 g protein)   Premier Protein (160 Calories, 30 g protein)  Slim Fast Advanced Nutrition (180 Calories, 20 g protein)  Muscle Milk, lactose-free, 17 oz bottle (210 Calories, 30 g protein)  Integrated Supplements, no artificial sugars (110 Calories, 20 g protein)  Quest Protein Bars (190 Calories, 20 g protein)  No Cow Protein Bar, gluten, dairy, and soy free (200 Calories, 20 g protein)    Frozen Meal Replacements  Healthy Choice  Lean Cuisine  Atkins Meals  Smart Ones    Aaliyah Steel RD, LD  Pager: [phone]    If you need to make or cancel an appointment, please call [phone].           [employer] Information     [employer] gives you secure access to your electronic health record. If you see a primary care provider, you can also send messages to your care team and make appointments. If you have questions, please call your primary care clinic.  If you do not have a primary care provider, please call [phone] and they will assist you.      [employer] is an electronic gateway that provides easy, online access to your medical records. With [employer], you can request a clinic appointment, read your test results, renew a prescription or communicate with your care team.     To access your existing account, please contact your [facility] or call [phone] for assistance.        Care EveryWhere ID     This is your Care EveryWhere ID. This could be used by other organizations to access your [facility] medical records  [id number]        Equal Access to Services     BRENDA VOGT : scott King qaybta kaalmada adeegyada, waxay idiin hayaan adeeg kharash la'aan ah. So [facility]  [phone].    ATENCIÓN: Si habla español, tiene a dobbs disposición servicios gratuitos de asistencia lingüística. Llame al [phone].    We comply with applicable federal civil rights laws and [place] laws. We do not discriminate on the basis of race, color, national origin, age, disability, sex, sexual orientation, or gender identity.

## 2018-01-11 NOTE — PATIENT INSTRUCTIONS
"GOALS:  Relating To Eating:  - Eat slowly (20-30 minutes per meal), chewing foods well (25 chews per bite/applesauce consistency).  - 9\" Plate method (1/2 plate non-starchy vegetables/fruit, 1/4 plate lean protein, 1/4 plate whole grain starch - no more than 1/2 cup carb/meal).   - Eat 3 meals per day. (Okay to have a meal replacement protein drink (~200 calorie or less, at least 20 grams of protein, and less than 10 grams of sugar).   - Avoid snacking.    Relating to beverages:  - Reduce caffeine/carbonation/calorie containing beverages. Eliminate soda intake and reduce juice intake.   - Drink 64 ounces of calorie-free/caffeine-free fluids per day.    Relating to dietary supplements:  -Start a multivitamin containing iron daily.    Relating to exercise:  - Walk 3 times per week, 20-30 minutes each time at mall.       Follow the Modified Liquid Diet for weight loss:  Breakfast: Protein Shake  Lunch: 3 oz lean protein + non-starchy vegetables  Supper: 3 oz lean protein + non-starchy vegetables  Snack: non-starchy vegetables (no calorie-containing dips/condiments)  Beverages: at least 48-64 oz water between meals daily    *Protein Shake Criteria: no more than 200 Calories, at least 20 grams of protein, and less than 10 grams of sugar     Meal Replacement Shake Options:   [facility] Meal Replacement (250 Calories, 35 g protein)   Premier Protein (160 Calories, 30 g protein)  Slim Fast Advanced Nutrition (180 Calories, 20 g protein)  Muscle Milk, lactose-free, 17 oz bottle (210 Calories, 30 g protein)  Integrated Supplements, no artificial sugars (110 Calories, 20 g protein)  Quest Protein Bars (190 Calories, 20 g protein)  No Cow Protein Bar, gluten, dairy, and soy free (200 Calories, 20 g protein)    Frozen Meal Replacements  Healthy Choice  Lean Cuisine  Atkins Meals  Smart Ones    Alaiyah Steel RD, LD  Pager: [phone]    If you need to make or cancel an appointment, please call [phone].    "

## 2018-01-17 ENCOUNTER — TELEPHONE (OUTPATIENT)
Dept: SURGERY | Facility: CLINIC | Age: 33
End: 2018-01-17

## 2018-01-17 ENCOUNTER — CARE COORDINATION (OUTPATIENT)
Dept: SURGERY | Facility: CLINIC | Age: 33
End: 2018-01-17

## 2018-01-17 DIAGNOSIS — Z01.818 PREOP TESTING: Primary | ICD-10-CM

## 2018-01-17 DIAGNOSIS — E66.01 MORBID OBESITY (H): ICD-10-CM

## 2018-01-17 NOTE — TELEPHONE ENCOUNTER
I informed Cari that she would need to be drug free (THC) for one year to have surgery with us - she states she has had recent use but will stop as of today. Potential for surgery after 1/17/19,can work with DIONNE and JAIME in the meantime.    Discussed reason for cessation and potential for addiction transfer.  She would like to check out other programs and not go with us. Then, she hung up the phone on me.  I called her back. She would like us to cancel her 2/12/18 with April and the 3/9/18 with Dr Alarcon.   I provided options for other programs in the [facility] area ([facility] and [facility] with Siria).

## 2018-01-17 NOTE — PROGRESS NOTES
"Tasklist updated.  Left message for client to call coordinator to review.  Needs to be one year drug (THC) free prior to surgery.  Hx using THC since October 2017 as an \"appetite suppressant\".  THC level ordered in Epic.    Bariatric Task List  Status:  Is patient a candidate for bariatric surgery?:  Yes -     Cleared to schedule surgeon consult?:    -     Status:  surgery evaluation in process -     Surgeon: Dr Rosas -     Tentative surgery month/year: TBD, needs to be one year drug free (hx THC use) -        Insurance: Insurance:  BCBS -        Patient Info: Initial Weight:  322 lb 8 oz -     Date of Initial Weight/Height:  11/22/2017 -     Goal Weight (lbs):  302 -     Required Weight Loss:  20 -     Surgery Type:  sleeve gastrectomy -        Dietician Visits: Structured weight loss required by insurance?:  Yes -     Dietician Visit 1:  Completed - 11/22 [facility] (DEC not done), 1/11/18 done   Dietician Visit 2:  Needed - 2/12/18 appt   Dietician Visit 3:  Needed -  March   Dietician Visit 4:  Needed -  April   Dietician Visit 5:  Needed -  May   Dietician Visit 6:  Needed -  June   Dietician Visit additional:    -     Clearance from dietician to see surgeon?:    -     Dietician Notes:  For [employer] need to be 6 consecutive months. -        Psychological Evaluation: Psych eval:  Needed - 3/8/18 Dr Alarcon   Therapist letter of support:    -     Psychiatrist letter of support:    -     Establish care with therapist:    -     Complete eating disorder evaluation:    -     Letter of clearance from therapist/eating disorder program:    -     Other:    -        Lab Work: Complete Blood Count:  Needed -     Comprehensive Metabolic Panel:  Needed -     Vitamin D:  Needed -     Hgb A1c:  Needed -     PTH:  Needed -     Nicotine Testing:  Needed -     Other:  Needed - THC testing      Consults/ Clearance: Sleep Medicine:  Needed - known ROCCO, not using CPAP, [facility] referral entered   Medical Weight Management: Completed - Per pt " "completed - and ongoing appts recommended.      PCP: Establish care with PCP:  Needed -     Follow up with PCP:    -     PCP letter of support:  Needed -        Smoking: Quit tobacco use (3 months smoke free)?:  Needed -     Quit date:    -        Patient Education:  Information Session:  Needed -     Given \"Making your decision\" handout?:  Yes -     Given support group information?:  Yes -     Attended support group?:    -     Support plan in place?:  Completed -     Research consents signed?:  Yes -        Final Tasks:  Before surgery online class:  Needed -     Before surgery online class website link:  [email or web address]   After surgery online class:  Needed -     After surgery online class website link:  [email or web address]   Nurse visit for weigh-in and information:  Needed -     Pre-assessment clinic visit with anesthesia team for H&P:  Needed -     Final labs (Hgb, plt, T&S, UA):  Needed -        Notes:   -           "

## 2018-01-24 ENCOUNTER — TELEPHONE (OUTPATIENT)
Dept: ENDOCRINOLOGY | Facility: CLINIC | Age: 33
End: 2018-01-24

## 2018-01-24 NOTE — TELEPHONE ENCOUNTER
Prior Authorization Retail Medication Request  Medication/Dose: Qsymia  Diagnosis and ICD code: morbid obesity  New/Renewal/Insurance Change PA: new  Previously Tried and Failed Therapies: diet, exercise    Insurance ID (if provided):   Insurance Phone (if provided):     Any additional info from fax request:     If you received a fax notification from an outside Pharmacy:  Pharmacy Name:[facility]  Pharmacy #: [phone]  Pharmacy Fax:[phone]

## 2018-01-25 NOTE — TELEPHONE ENCOUNTER
Central Prior Authorization Team   Phone: [phone]      PA Initiation    Medication: Qsymia-PA initiated  Insurance Company: [employer] - Phone [phone] Fax [phone]  Pharmacy Filling the Rx: [id number] - [place], [place] - [address] AT [facility] OF [address]  Filling Pharmacy Phone: [phone]  Filling Pharmacy Fax:    Start Date: 1/25/2018

## 2018-01-28 ENCOUNTER — HEALTH MAINTENANCE LETTER (OUTPATIENT)
Age: 33
End: 2018-01-28

## 2018-02-02 ENCOUNTER — TELEPHONE (OUTPATIENT)
Dept: ENDOCRINOLOGY | Facility: CLINIC | Age: 33
End: 2018-02-02

## 2018-02-02 NOTE — TELEPHONE ENCOUNTER
Called patient to inform her that the prescription for Qsymia was denied by hr insurance. Dr. Crockett had offered her to try starting Saxenda. Patient does not want to give herself daily injections. I offered the option to split the Qsymia into Phentermine and Topiratmate separately. Patient stated that she wants to hold off all weight loss medications at this time. She said she was feeling foggy on the medications. Advised patient to contact us by phone or mychart if and when she would like to follow up with Dr. Crockett.

## 2018-03-03 DIAGNOSIS — J45.31 MILD PERSISTENT ASTHMA WITH EXACERBATION: ICD-10-CM

## 2018-03-05 RX ORDER — ALBUTEROL SULFATE 90 UG/1
AEROSOL, METERED RESPIRATORY (INHALATION)
Start: 2018-03-05

## 2018-03-05 NOTE — TELEPHONE ENCOUNTER
"Requested Prescriptions   Pending Prescriptions Disp Refills     PROAIR  (90 BASE) MCG/ACT inhaler [Pharmacy Med Name: PROAIR HFA ORAL INH (200  PFS) 8.5G] 8.5 g 0     Sig: INHALE 2 PUFFS BY MOUTH EVERY 6 HOURS    Asthma Maintenance Inhalers - Anticholinergics Failed    3/3/2018  3:28 PM       Failed - Asthma control assessment score within normal limits in last 6 months    Please review ACT score.   No flowsheet data found.         Passed - Patient is age 12 years or older       Passed - Recent (6 mo) or future (30 days) visit within the authorizing provider's specialty    Patient had office visit in the last 6 months or has a visit in the next 30 days with authorizing provider.  See \"Patient Info\" tab in inbasket, or \"Choose Columns\" in Meds & Orders section of the refill encounter.            "

## 2018-03-05 NOTE — TELEPHONE ENCOUNTER
Denied  Needs appointment    Never seen at [facility]  Saw Nayeli at     Karlie CORCORAN RN

## 2020-03-11 ENCOUNTER — HEALTH MAINTENANCE LETTER (OUTPATIENT)
Age: 35
End: 2020-03-11

## 2020-12-27 ENCOUNTER — HEALTH MAINTENANCE LETTER (OUTPATIENT)
Age: 35
End: 2020-12-27

## 2021-04-25 ENCOUNTER — HEALTH MAINTENANCE LETTER (OUTPATIENT)
Age: 36
End: 2021-04-25

## 2021-10-09 ENCOUNTER — HEALTH MAINTENANCE LETTER (OUTPATIENT)
Age: 36
End: 2021-10-09

## 2022-05-21 ENCOUNTER — HEALTH MAINTENANCE LETTER (OUTPATIENT)
Age: 37
End: 2022-05-21

## 2022-09-17 ENCOUNTER — HEALTH MAINTENANCE LETTER (OUTPATIENT)
Age: 37
End: 2022-09-17

## 2023-02-15 ENCOUNTER — LAB REQUISITION (OUTPATIENT)
Dept: LAB | Facility: CLINIC | Age: 38
End: 2023-02-15

## 2023-02-15 ENCOUNTER — MEDICAL CORRESPONDENCE (OUTPATIENT)
Dept: HEALTH INFORMATION MANAGEMENT | Facility: CLINIC | Age: 38
End: 2023-02-15
Payer: COMMERCIAL

## 2023-02-15 DIAGNOSIS — Z76.89 PERSONS ENCOUNTERING HEALTH SERVICES IN OTHER SPECIFIED CIRCUMSTANCES: ICD-10-CM

## 2023-02-15 LAB
ANION GAP SERPL CALCULATED.3IONS-SCNC: 10 MMOL/L (ref 7–15)
BUN SERPL-MCNC: 11 MG/DL (ref 6–20)
CALCIUM SERPL-MCNC: 8.7 MG/DL (ref 8.6–10)
CHLORIDE SERPL-SCNC: 105 MMOL/L (ref 98–107)
CHOLEST SERPL-MCNC: 98 MG/DL
CREAT SERPL-MCNC: 0.87 MG/DL (ref 0.51–0.95)
DEPRECATED HCO3 PLAS-SCNC: 25 MMOL/L (ref 22–29)
GFR SERPL CREATININE-BSD FRML MDRD: 89 ML/MIN/1.73M2
GLUCOSE SERPL-MCNC: 80 MG/DL (ref 70–99)
HDLC SERPL-MCNC: 55 MG/DL
LDLC SERPL CALC-MCNC: 36 MG/DL
NONHDLC SERPL-MCNC: 43 MG/DL
POTASSIUM SERPL-SCNC: 3.8 MMOL/L (ref 3.4–5.3)
SODIUM SERPL-SCNC: 140 MMOL/L (ref 136–145)
TRIGL SERPL-MCNC: 37 MG/DL

## 2023-02-15 PROCEDURE — 80061 LIPID PANEL: CPT

## 2023-02-15 PROCEDURE — 87389 HIV-1 AG W/HIV-1&-2 AB AG IA: CPT

## 2023-02-15 PROCEDURE — 86780 TREPONEMA PALLIDUM: CPT

## 2023-02-15 PROCEDURE — 80048 BASIC METABOLIC PNL TOTAL CA: CPT

## 2023-02-16 LAB
HIV 1+2 AB+HIV1 P24 AG SERPL QL IA: NONREACTIVE
T PALLIDUM AB SER QL: NONREACTIVE

## 2023-02-20 ENCOUNTER — TRANSCRIBE ORDERS (OUTPATIENT)
Dept: OTHER | Age: 38
End: 2023-02-20

## 2023-02-20 DIAGNOSIS — N91.2 AMENORRHEA: Primary | ICD-10-CM

## 2023-06-04 ENCOUNTER — HEALTH MAINTENANCE LETTER (OUTPATIENT)
Age: 38
End: 2023-06-04

## 2024-10-15 NOTE — TELEPHONE ENCOUNTER
LAST REFILL - 09/09/24  LAST VISIT - 07/16/24  NEXT VISIT - 11/12/24    Did not change directions, please advise how to take medication.   PRIOR AUTHORIZATION DENIED    Medication: Qsymia-PA denied    Denial Date: 1/25/2001    Denial Rational:  Medication not covered-must use BELVIQ, BELVIQ KTAHLEEN,CONTRAVE, SAXENDA    Appeal Information: N/A

## 2024-10-17 NOTE — PROGRESS NOTES
"Follow-up Bariatric Nutrition Consultation Note    Reason For Visit: Nutrition Reassessment    Cari Barajas is a 32 year old presenting today for new bariatric nutrition consult. Pt is interested in laparoscopic sleeve gastrectomy with Dr. Rosas expected surgery in May 2018. This is pt's second of 6 required nutrition visits prior to surgery. Pt referred by Zeenat Lowe PA-C (11/22/17).     Coordination Note:  - Completed [facility] consult on 1/8/18  - Needs to schedule: Psych evaluation, Lab work, Sleep/medicine consult   - 1/11/18: Pt reported utilizing marijuana to suppress her appetite. Writer strongly encouraged cessation, and later notified Bariatric team.     ANTHROPOMETRICS:    Initial Weight on 11/22/17: 322.5 lbs    Current Weight on 1/11/17: 316.6 lbs     Weight loss: -5.9 lbs from initial consult      Required weight loss goal pre-op: -20 lbs from initial consult weight (goal weight 302.5 lbs or less before surgery)    SUPPLEMENT INFORMATION:  *Of note, pt started Qsymia post- [facility] consult with Dr Crockett (1/8/18)    NUTRITION HISTORY:  Progress toward previous goals:  Relating To Eating:  -- Eat slowly (20-30 minutes per meal), chewing foods well (25 chews per bite/applesauce consistency). - Not met, continues. Eats slowly (20 minutes/meal), but only chewing food to applesauce consistency ~50% of the time.   -- Eat 3 meals per day. - Not met, continues. Due to lack of appetite, pt eating 1-2 meals/day.  -- 9\" Plate method (1/2 plate non-starchy vegetables/fruit, 1/4 plate lean protein, 1/4 plate whole grain starch - no more than 1/2 cup carb/meal). Okay to have a meal replacement protein drink (~200 calorie or less, at least 20 grams of protein, and less than 10 grams of sugar). - Not met, continues. 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 Otherwise, fast food (e.g. Small portion of chx nuggets/fries or homemade fried chx her family prepares).  -- Avoid " "snacking. - Met       Relating to beverages:   -- Reduce caffeine/carbonation/calorie containing beverages. Eliminate soda intake and reduce juice intake.  - Not met, continues. Drinking 12 oz soda/day  -- Drink 64 ounces of calorie-free/caffeine-free fluids per day. - Not met, continues. Drinking 24-32 oz per day    Relating to dietary supplements:  -- Start a multivitamin containing iron daily. - Not met, continues.       ADDITIONAL INFORMATION:  Pt will need to quit smoking prior to surgery.    NUTRITION DIAGNOSIS:  Obesity r/t long history of self-monitoring deficit and excessive energy intake aeb BMI >30.     INTERVENTION:  Intervention Provided/Education Provided on significance of eating 3 meals per day in relation to appetite/metabolism/weight loss goals long-term. Discussed scheduling meals to provide routine, emphasizing portion/calorie-control, 9\" plate method and volumetrics methods at each meal.  Reviewed ways to help prepare for post-op diet guidelines preoperatively (e.g. Chewing to applesauce consistency, avoiding carbonated beverages). Gave encouragement and support. Provided pt with criteria for protein shakes and list of possible options per pt request, list of goals, and RD contact information.      Patient Understanding: good  Expected Compliance: good   Follow-up: Quitting smoking    GOALS:  Relating To Eating:  -- Eat slowly (20-30 minutes per meal), chewing foods well (25 chews per bite/applesauce consistency).  -- Eat 3 meals per day. (Okay to have a meal replacement protein drink (~200 calorie or less, at least 20 grams of protein, and less than 10 grams of sugar)  -- 9\" Plate method (1/2 plate non-starchy vegetables/fruit, 1/4 plate lean protein, 1/4 plate whole grain starch - no more than 1/2 cup carb/meal) at each meal.    -- Avoid snacking.    Relating to beverages:  -- Reduce caffeine/carbonation/calorie containing beverages. Eliminate soda intake and reduce juice intake.   -- Drink 64 " ounces of calorie-free/caffeine-free fluids per day.    Relating to dietary supplements:  -- Start a multivitamin containing iron daily.    Relating to exercise:  -- Walk 3 times per week, 20-30 minutes each time at mall.       Follow-Up:   1 month or PRN    Time spent with patient: 30 minutes    Aaliyah Steel RD, LD         cranial nerves II-XII intact/sensation intact/responds to pain/responds to verbal commands details…